# Patient Record
Sex: MALE | Race: WHITE | NOT HISPANIC OR LATINO | Employment: FULL TIME | URBAN - METROPOLITAN AREA
[De-identification: names, ages, dates, MRNs, and addresses within clinical notes are randomized per-mention and may not be internally consistent; named-entity substitution may affect disease eponyms.]

---

## 2017-02-27 ENCOUNTER — APPOINTMENT (EMERGENCY)
Dept: RADIOLOGY | Facility: HOSPITAL | Age: 47
End: 2017-02-27
Payer: COMMERCIAL

## 2017-02-27 ENCOUNTER — HOSPITAL ENCOUNTER (EMERGENCY)
Facility: HOSPITAL | Age: 47
Discharge: HOME | End: 2017-02-27
Attending: EMERGENCY MEDICINE | Admitting: EMERGENCY MEDICINE
Payer: COMMERCIAL

## 2017-02-27 VITALS
HEART RATE: 61 BPM | HEIGHT: 69 IN | OXYGEN SATURATION: 96 % | BODY MASS INDEX: 28.88 KG/M2 | WEIGHT: 195 LBS | SYSTOLIC BLOOD PRESSURE: 141 MMHG | TEMPERATURE: 98.6 F | RESPIRATION RATE: 16 BRPM | DIASTOLIC BLOOD PRESSURE: 90 MMHG

## 2017-02-27 DIAGNOSIS — R07.9 CHEST PAIN: Primary | ICD-10-CM

## 2017-02-27 LAB
ALBUMIN SERPL BCP-MCNC: 3.9 G/DL (ref 3.5–5)
ALP SERPL-CCNC: 93 U/L (ref 46–116)
ALT SERPL W P-5'-P-CCNC: 48 U/L (ref 12–78)
ANION GAP SERPL CALCULATED.3IONS-SCNC: 11 MMOL/L (ref 4–13)
AST SERPL W P-5'-P-CCNC: 24 U/L (ref 5–45)
BASOPHILS # BLD AUTO: 0.1 THOUSANDS/ΜL (ref 0–0.1)
BASOPHILS NFR BLD AUTO: 2 % (ref 0–1)
BILIRUB SERPL-MCNC: 0.6 MG/DL (ref 0.2–1)
BUN SERPL-MCNC: 17 MG/DL (ref 5–25)
CALCIUM SERPL-MCNC: 8.6 MG/DL (ref 8.3–10.1)
CHLORIDE SERPL-SCNC: 106 MMOL/L (ref 100–108)
CO2 SERPL-SCNC: 25 MMOL/L (ref 21–32)
CREAT SERPL-MCNC: 1.1 MG/DL (ref 0.6–1.3)
DEPRECATED D DIMER PPP: 280 NG/ML (FEU) (ref 190–520)
EOSINOPHIL # BLD AUTO: 0.4 THOUSAND/ΜL (ref 0–0.61)
EOSINOPHIL NFR BLD AUTO: 4 % (ref 0–6)
ERYTHROCYTE [DISTWIDTH] IN BLOOD BY AUTOMATED COUNT: 12.8 % (ref 11.6–15.1)
GFR SERPL CREATININE-BSD FRML MDRD: >60 ML/MIN/1.73SQ M
GLUCOSE SERPL-MCNC: 94 MG/DL (ref 65–140)
HCT VFR BLD AUTO: 45.9 % (ref 42–52)
HGB BLD-MCNC: 15.6 G/DL (ref 14–18)
LIPASE SERPL-CCNC: 139 U/L (ref 73–393)
LYMPHOCYTES # BLD AUTO: 2.8 THOUSANDS/ΜL (ref 0.6–4.47)
LYMPHOCYTES NFR BLD AUTO: 31 % (ref 14–44)
MCH RBC QN AUTO: 31.5 PG (ref 27–31)
MCHC RBC AUTO-ENTMCNC: 34 G/DL (ref 31.4–37.4)
MCV RBC AUTO: 93 FL (ref 82–98)
MONOCYTES # BLD AUTO: 0.6 THOUSAND/ΜL (ref 0.17–1.22)
MONOCYTES NFR BLD AUTO: 7 % (ref 4–12)
NEUTROPHILS # BLD AUTO: 5.2 THOUSANDS/ΜL (ref 1.85–7.62)
NEUTS SEG NFR BLD AUTO: 57 % (ref 43–75)
NRBC BLD AUTO-RTO: 0 /100 WBCS
PLATELET # BLD AUTO: 188 THOUSANDS/UL (ref 130–400)
PMV BLD AUTO: 8.6 FL (ref 8.9–12.7)
POTASSIUM SERPL-SCNC: 3.8 MMOL/L (ref 3.5–5.3)
PROT SERPL-MCNC: 7.2 G/DL (ref 6.4–8.2)
RBC # BLD AUTO: 4.96 MILLION/UL (ref 4.7–6.1)
SODIUM SERPL-SCNC: 142 MMOL/L (ref 136–145)
TROPONIN I SERPL-MCNC: <0.02 NG/ML
WBC # BLD AUTO: 9.2 THOUSAND/UL (ref 4.8–10.8)

## 2017-02-27 PROCEDURE — 36415 COLL VENOUS BLD VENIPUNCTURE: CPT | Performed by: EMERGENCY MEDICINE

## 2017-02-27 PROCEDURE — 71010 HB CHEST X-RAY 1 VIEW FRONTAL (PORTABLE): CPT

## 2017-02-27 PROCEDURE — 83690 ASSAY OF LIPASE: CPT | Performed by: EMERGENCY MEDICINE

## 2017-02-27 PROCEDURE — 80053 COMPREHEN METABOLIC PANEL: CPT | Performed by: EMERGENCY MEDICINE

## 2017-02-27 PROCEDURE — 99285 EMERGENCY DEPT VISIT HI MDM: CPT

## 2017-02-27 PROCEDURE — 85025 COMPLETE CBC W/AUTO DIFF WBC: CPT | Performed by: EMERGENCY MEDICINE

## 2017-02-27 PROCEDURE — 93005 ELECTROCARDIOGRAM TRACING: CPT | Performed by: EMERGENCY MEDICINE

## 2017-02-27 PROCEDURE — 84484 ASSAY OF TROPONIN QUANT: CPT | Performed by: EMERGENCY MEDICINE

## 2017-02-27 PROCEDURE — 85379 FIBRIN DEGRADATION QUANT: CPT | Performed by: EMERGENCY MEDICINE

## 2017-02-27 RX ORDER — OMEPRAZOLE 20 MG/1
40 CAPSULE, DELAYED RELEASE ORAL DAILY
Qty: 60 CAPSULE | Refills: 0 | Status: SHIPPED | OUTPATIENT
Start: 2017-02-27 | End: 2018-02-08

## 2017-02-27 RX ORDER — OMEPRAZOLE 20 MG/1
20 CAPSULE, DELAYED RELEASE ORAL EVERY MORNING
COMMUNITY
End: 2018-02-12 | Stop reason: ALTCHOICE

## 2017-02-27 RX ORDER — ASPIRIN 325 MG
325 TABLET, DELAYED RELEASE (ENTERIC COATED) ORAL ONCE
Status: DISCONTINUED | OUTPATIENT
Start: 2017-02-27 | End: 2017-02-27 | Stop reason: HOSPADM

## 2017-02-27 RX ORDER — METOPROLOL TARTRATE 50 MG/1
50 TABLET, FILM COATED ORAL EVERY MORNING
COMMUNITY
End: 2019-11-22 | Stop reason: SDUPTHER

## 2017-02-27 RX ORDER — VALSARTAN 80 MG/1
80 TABLET ORAL EVERY MORNING
COMMUNITY
End: 2019-11-22 | Stop reason: SDUPTHER

## 2017-02-27 RX ORDER — NITROGLYCERIN 0.4 MG/1
0.4 TABLET SUBLINGUAL ONCE
Status: DISCONTINUED | OUTPATIENT
Start: 2017-02-27 | End: 2017-02-27 | Stop reason: HOSPADM

## 2017-03-01 LAB
ATRIAL RATE: 58 BPM
P AXIS: 46 DEGREES
PR INTERVAL: 158 MS
QRS AXIS: -1 DEGREES
QRSD INTERVAL: 108 MS
QT INTERVAL: 426 MS
QTC INTERVAL: 418 MS
T WAVE AXIS: 23 DEGREES
VENTRICULAR RATE: 58 BPM

## 2017-08-02 ENCOUNTER — TRANSCRIBE ORDERS (OUTPATIENT)
Dept: ADMINISTRATIVE | Facility: HOSPITAL | Age: 47
End: 2017-08-02

## 2017-08-02 DIAGNOSIS — R94.5 NONSPECIFIC ABNORMAL RESULTS OF LIVER FUNCTION STUDY: Primary | ICD-10-CM

## 2017-08-02 DIAGNOSIS — R16.1 SPLENOMEGALY: ICD-10-CM

## 2017-08-02 DIAGNOSIS — K75.81 NONALCOHOLIC STEATOHEPATITIS: ICD-10-CM

## 2017-08-15 ENCOUNTER — HOSPITAL ENCOUNTER (OUTPATIENT)
Dept: RADIOLOGY | Facility: HOSPITAL | Age: 47
Discharge: HOME | End: 2017-08-15
Attending: INTERNAL MEDICINE
Payer: COMMERCIAL

## 2017-08-15 DIAGNOSIS — R94.5 NONSPECIFIC ABNORMAL RESULTS OF LIVER FUNCTION STUDY: ICD-10-CM

## 2017-08-15 DIAGNOSIS — R16.1 SPLENOMEGALY: ICD-10-CM

## 2017-08-15 DIAGNOSIS — K75.81 NONALCOHOLIC STEATOHEPATITIS: ICD-10-CM

## 2017-08-15 PROCEDURE — 76700 US EXAM ABDOM COMPLETE: CPT

## 2017-12-26 ENCOUNTER — ALLSCRIPTS OFFICE VISIT (OUTPATIENT)
Dept: OTHER | Facility: OTHER | Age: 47
End: 2017-12-26

## 2017-12-26 DIAGNOSIS — K50.80 CROHN'S DISEASE OF BOTH SMALL AND LARGE INTESTINE WITHOUT COMPLICATION (HCC): ICD-10-CM

## 2017-12-27 NOTE — CONSULTS
Assessment   1  Crohn's disease of both small and large intestine without complication (295 5) (S30 81)   2  GERD without esophagitis (530 81) (K21 9)    Plan   Crohn's disease of both small and large intestine without complication    · Suprep Bowel Prep Kit 17 5-3 13-1 6 GM/180ML Oral Solution; USE AS DIRECTED   Rx By: Chris Patterson; Dispense: 0 Days ; #:1 X 177 ML Bottle (2 Bottles); Refill: 0;For: Crohn's disease of both small and large intestine without complication; LINDA = N; Verified Transmission to [facility]; Last Updated By: [employer]; 12/26/2017 4:34:33 PM   · (1) CALPROTECTIN, FECAL; Status:Active; Requested for:26Dec2017;    Perform:[facility]; Due:26Dec2018; Ordered; For:Crohn's disease of both small and large intestine without complication; Ordered By:Olu Price;   · (1) C-REACTIVE PROTEIN; Status:Active; Requested for:96Ile9913;    Perform:[facility]; Due:97Rjb5752; Ordered; For:Crohn's disease of both small and large intestine without complication; Ordered By:Olu Price;   · (1) SED RATE; Status:Active; Requested for:[id number];    Perform:[facility]; Due:26Dec2018; Ordered; For:Crohn's disease of both small and large intestine without complication; Ordered By:Olu Price;   · * DXA BONE DENSITY SPINE HIP AND PELVIS; Status:Active; Requested for:26Dec2017;       Perform:[facility]; Due:26Dec2018; Last Updated By:Tessa Owusu; 12/26/2017 4:45:31 PM;Ordered; For:Crohn's disease of both small and large intestine without complication; Ordered By:Olu Price;   · COLONOSCOPY (GI, SURG); Status:Active; Requested for:26Dec2017;    Perform:[facility]; Due:26Dec2018; Last Updated By:Tessa Owusu; 12/26/2017 4:43:11 PM;Ordered; For:Crohn's disease of both small and large intestine without complication; Ordered By:Olu Price;  GERD without esophagitis    · EGD; Status:Active;  Requested for:58Pyz7133;    Perform:[facility]; Due:58Wrj6537; Last Updated By:Mario Owusu; 12/26/2017 4:43:11 PM;Ordered; For:GERD without esophagitis; Ordered By:Olu Price;    Discussion/Summary   Discussion Summary:    Agustin 49year-old gentle with longstanding history of Crohn's disease, he has had 2 ileocolonic surgeries many years ago, last surgery was about 6 years ago, in clinical remission on Remicade and history of chronic GERD  Crohn's disease: Predominant the small bowel and large intestine status post resection x2, in clinical remission on Remicade last colonoscopy was about 5 or 6 years ago the patient to get a DEXA scan the patient get vaccinated for influenza and pneumonia will check a sed rate, CRP, fecal calprotectin for baseline inflammatory markers should proceed with colonoscopy to evaluate for any mucosal damage with family history of colon cancer in his father, we will also check for polyps and colon cancer screening home Remicade infusions reports that he is not receiving or taking Benadryl and Tylenol prior to his infusion   Chronic GERD: is on PPI therapy will proceed with an upper endoscopy the same time as colonoscopy to exclude any Srivastava's esophagus and upper GI Crohn's   discussed with him the risks of the procedures including bleeding, surgery, perforation, missed polyp detection rate  Chief Complaint   Chief Complaint Free Text Note Form: Here to establish care for Crohn's disease      History of Present Illness   HPI: Agustin 18-year-old gentle with longstanding history of ileal Crohn's disease for over 20 years, he has had 2 ileal cecectomy is, has been on Remicade for the past 5 years and has been in clinical remission  He has 1 to 2 soft bowel movements daily  He had 1 episode of rectal bleeding about a week ago  Has a history of iritis in the past but nothing for the last 10 years  No significant arthralgias while on Remicade  His appetite is good  He has gained significant weight   He denies any nausea vomiting but has a history of chronic acid reflux has been on PPI therapy for greater than 10 15 years, if he stops PPI therapy for 1 or 2 days he has significant rebound symptoms  Denies any dysphagia  patient denies any abdominal pain  He has a strong family history of inflammatory bowel disease in his paternal uncle and cousins  His father was diagnosed with colon cancer last year the age of 79  Patient's last colonoscopy was 4 or 5 years ago  works for [employer]  Denies any tobacco  He drinks about 4 drinks per week  Patient receives Remicade infusions at home every 8 weeks  Review of Systems   Complete-Male GI Adult: Other Symptoms: The remainder of the ten ROS was negative  Past Medical History   1  History of Crohn's disease (V12 70) (Z87 19)  Active Problems And Past Medical History Reviewed: The active problems and past medical history were reviewed and updated today  Surgical History   1  History of Complete Colonoscopy  Surgical History Reviewed: The surgical history was reviewed and updated today  Family History   Father    1  Family history of colon cancer (V16 0) (Z80 0)  Uncle    2  Family history of Crohn's disease (V18 59) (Z80 76)  Family History Reviewed: The family history was reviewed and updated today  Social History    · No alcohol use   · Occasional alcohol consumption (V49 89) (Z78 9)  Social History Reviewed: The social history was reviewed and updated today  Current Meds    1  Metoprolol Tartrate 50 MG Oral Tablet; Therapy: (Recorded:33Bsc1596) to Recorded   2  Omeprazole 40 MG Oral Capsule Delayed Release; Therapy: (Recorded:25Dbs7039) to Recorded   3  Remicade 100 MG Intravenous Solution Reconstituted; Therapy: (Recorded:02Qgv2194) to Recorded   4  Valsartan 160 MG Oral Tablet; Therapy: (Recorded:76Sem3132) to Recorded  Medication List Reviewed: The medication list was reviewed and updated today        Allergies 1  No Known Drug Allergies    Vitals   Vital Signs    Recorded: 88IXK6289 04:00PM   Temperature 96 5 F   Heart Rate 72   Systolic 407   Diastolic 68   Weight 355 lb    O2 Saturation 98     Physical Exam   Gen: wn/wd, NAD     HEENT: anicteric, MMM, no cervical LAD     CVS: RRR, no m/r/g     CHEST: CTA b/l     ABD: +BS, soft, NT,ND, no hepatosplenomegaly, well-healed midline surgical scar     EXT: no c/c/e     NEURO: aaox3     SKIN: NO rashes             Future Appointments      Date/Time Provider Specialty Site   02/12/2018 08:30 AM BRIANNA Sotelo   Gastroenterology Adult [facility]     Signatures    Electronically signed by : BRIANNA Palomo ; Dec 26 2017  5:55PM EST                       (Author)

## 2018-01-23 VITALS
OXYGEN SATURATION: 98 % | DIASTOLIC BLOOD PRESSURE: 68 MMHG | BODY MASS INDEX: 30.72 KG/M2 | SYSTOLIC BLOOD PRESSURE: 118 MMHG | HEART RATE: 72 BPM | TEMPERATURE: 96.5 F | WEIGHT: 208 LBS

## 2018-01-23 NOTE — CONSULTS
Chief Complaint  Here to establish care for Crohn's disease      History of Present Illness  Pleasant 16-year-old gentle with longstanding history of ileal Crohn's disease for over 20 years, he has had 2 ileal cecectomy is, has been on Remicade for the past 5 years and has been in clinical remission  He has 1 to 2 soft bowel movements daily  He had 1 episode of rectal bleeding about a week ago  Has a history of iritis in the past but nothing for the last 10 years  No significant arthralgias while on Remicade  His appetite is good  He has gained significant weight  He denies any nausea vomiting but has a history of chronic acid reflux has been on PPI therapy for greater than 10 15 years, if he stops PPI therapy for 1 or 2 days he has significant rebound symptoms  Denies any dysphagia  His patient denies any abdominal pain  He has a strong family history of inflammatory bowel disease in his paternal uncle and cousins  His father was diagnosed with colon cancer last year the age of 79  Patient's last colonoscopy was 4 or 5 years ago  He works for [employer]  Denies any tobacco  He drinks about 4 drinks per week  Patient receives Remicade infusions at home every 8 weeks  Review of Systems    Other Symptoms: The remainder of the ten ROS was negative  Past Medical History    · History of Crohn's disease (V12 70) (Z87 19)    The active problems and past medical history were reviewed and updated today  Surgical History    · History of Complete Colonoscopy    The surgical history was reviewed and updated today  Family History    · Family history of colon cancer (V16 0) (Z80 0)    · Family history of Crohn's disease (V18 59) (Z83 79)    The family history was reviewed and updated today  Social History    · No alcohol use   · Occasional alcohol consumption (V49 89) (Z78 9)  The social history was reviewed and updated today  Current Meds   1   Metoprolol Tartrate 50 MG Oral Tablet; Therapy: (Recorded:20Znt0841) to Recorded   2  Omeprazole 40 MG Oral Capsule Delayed Release; Therapy: (Recorded:17Umk2829) to Recorded   3  Remicade 100 MG Intravenous Solution Reconstituted; Therapy: (Recorded:83Fek2915) to Recorded   4  Valsartan 160 MG Oral Tablet; Therapy: (Recorded:66Uiy8923) to Recorded    The medication list was reviewed and updated today  Allergies    1  No Known Drug Allergies    Vitals   Recorded: 50PBH9395 04:00PM   Temperature 96 5 F   Heart Rate 72   Systolic 847   Diastolic 68   Weight 742 lb    O2 Saturation 98     Physical Exam  Gen: wn/wd, NAD  HEENT: anicteric, MMM, no cervical LAD  CVS: RRR, no m/r/g  CHEST: CTA b/l  ABD: +BS, soft, NT,ND, no hepatosplenomegaly, well-healed midline surgical scar  EXT: no c/c/e  NEURO: aaox3  SKIN: NO rashes         Assessment    1  Crohn's disease of both small and large intestine without complication (358 1) (W28 65)   2  GERD without esophagitis (530 81) (K21 9)    Plan  Crohn's disease of both small and large intestine without complication    · Suprep Bowel Prep Kit 17 5-3 13-1 6 GM/180ML Oral Solution; USE AS DIRECTED   Rx By: Dayanara Sheets; Dispense: 0 Days ; #:1 X 177 ML Bottle (2 Bottles); Refill: 0; For: Crohn's disease of both small and large intestine without complication; LINDA = N; Verified Transmission to [facility]; Last Updated By: [employer]; 12/26/2017 4:34:33 PM   · (1) CALPROTECTIN, FECAL; Status:Active; Requested for:58Bcn7959;    Perform:[facility]; Due:32Euz6535; Ordered; For:Crohn's disease of both small and large intestine without complication; Ordered By:Olu Price;   · (1) C-REACTIVE PROTEIN; Status:Active; Requested for:55Nwt1709;    Perform:[facility]; Due:58Gko6645; Ordered; For:Crohn's disease of both small and large intestine without complication; Ordered By:Olu Price;   · (1) SED RATE; Status:Active;  Requested for:87Ymw5346;    Perform:[facility]; ZSN:[id number]; Ordered; For:Crohn's disease of both small and large intestine without complication; Ordered By:Olu Price;   · * DXA BONE DENSITY SPINE HIP AND PELVIS; Status:Active; Requested for:26Dec2017;     Perform:[facility]; Due:26Dec2018; Last Updated By:Rashad Owusu Bound; 12/26/2017 4:45:31 PM;Ordered; For:Crohn's disease of both small and large intestine without complication; Ordered By:Olu Price;   · COLONOSCOPY (GI, SURG); Status:Active; Requested for:26Dec2017;    Perform:[facility]; Due:26Dec2018; Last Updated By:Rashad Owusu Bound; 12/26/2017 4:43:11 PM;Ordered; For:Crohn's disease of both small and large intestine without complication; Ordered By:Olu Price;  GERD without esophagitis    · EGD; Status:Active; Requested for:26Dec2017;    Perform:[facility]; Due:72Ohw1756; Last Updated By:Rashad Owusu Bound; 12/26/2017 4:43:11 PM;Ordered; For:GERD without esophagitis; Ordered By:Olu Price;    Discussion/Summary    Pleasant 49year-old gentle with longstanding history of Crohn's disease, he has had 2 ileocolonic surgeries many years ago, last surgery was about 6 years ago, in clinical remission on Remicade and history of chronic GERD  1  Crohn's disease: Predominant the small bowel and large intestine status post resection x2, in clinical remission on Remicade  -his last colonoscopy was about 5 or 6 years ago  -recommended the patient to get a DEXA scan  -recommend the patient get vaccinated for influenza and pneumonia  -we will check a sed rate, CRP, fecal calprotectin for baseline inflammatory markers  -we should proceed with colonoscopy to evaluate for any mucosal damage  -also with family history of colon cancer in his father, we will also check for polyps and colon cancer screening  -continue home Remicade infusions  -patient reports that he is not receiving or taking Benadryl and Tylenol prior to his infusion    2   Chronic GERD:  -patient is on PPI therapy  -we will proceed with an upper endoscopy the same time as colonoscopy to exclude any Srivastava's esophagus and upper GI Crohn's    I discussed with him the risks of the procedures including bleeding, surgery, perforation, missed polyp detection rate        Future Appointments    Signatures   Electronically signed by : BRIANNA Cordova ; Dec 26 2017  5:55PM EST                       (Author)

## 2018-01-25 LAB — CALPROTECTIN STL-MCNT: 26 UG/G (ref 0–120)

## 2018-01-29 ENCOUNTER — TELEPHONE (OUTPATIENT)
Dept: GASTROENTEROLOGY | Facility: CLINIC | Age: 48
End: 2018-01-29

## 2018-02-01 PROBLEM — K50.819 CROHN'S DISEASE OF SMALL AND LARGE INTESTINES WITH COMPLICATION (HCC): Status: ACTIVE | Noted: 2018-02-01

## 2018-02-01 PROBLEM — K21.9 GASTROESOPHAGEAL REFLUX DISEASE WITHOUT ESOPHAGITIS: Status: ACTIVE | Noted: 2018-02-01

## 2018-02-08 RX ORDER — INFLIXIMAB 100 MG/10ML
INJECTION, POWDER, LYOPHILIZED, FOR SOLUTION INTRAVENOUS
COMMUNITY
End: 2019-02-20 | Stop reason: SDUPTHER

## 2018-02-08 NOTE — PRE-PROCEDURE INSTRUCTIONS
Pre-Surgery Instructions:   Medication Instructions    inFLIXimab (REMICADE) 100 mg Patient was instructed by Physician and understands   metoprolol tartrate (LOPRESSOR) 50 mg tablet Patient was instructed by Physician and understands   omeprazole (PriLOSEC) 20 mg delayed release capsule Patient was instructed by Physician and understands   valsartan (DIOVAN) 80 mg tablet Patient was instructed by Physician and understands  Pt to follow Dr Juliane Umanzor instructions    [employer]

## 2018-02-09 DIAGNOSIS — K50.818 CROHN'S DISEASE OF BOTH SMALL AND LARGE INTESTINE WITH OTHER COMPLICATION (HCC): Primary | ICD-10-CM

## 2018-02-11 ENCOUNTER — ANESTHESIA EVENT (OUTPATIENT)
Dept: GASTROENTEROLOGY | Facility: AMBULARY SURGERY CENTER | Age: 48
End: 2018-02-11
Payer: COMMERCIAL

## 2018-02-12 ENCOUNTER — HOSPITAL ENCOUNTER (OUTPATIENT)
Facility: AMBULARY SURGERY CENTER | Age: 48
Setting detail: OUTPATIENT SURGERY
Discharge: HOME | End: 2018-02-12
Attending: INTERNAL MEDICINE | Admitting: INTERNAL MEDICINE
Payer: COMMERCIAL

## 2018-02-12 ENCOUNTER — ANESTHESIA (OUTPATIENT)
Dept: GASTROENTEROLOGY | Facility: AMBULARY SURGERY CENTER | Age: 48
End: 2018-02-12
Payer: COMMERCIAL

## 2018-02-12 ENCOUNTER — TELEPHONE (OUTPATIENT)
Dept: GASTROENTEROLOGY | Facility: CLINIC | Age: 48
End: 2018-02-12

## 2018-02-12 VITALS
HEART RATE: 66 BPM | OXYGEN SATURATION: 98 % | TEMPERATURE: 96.9 F | SYSTOLIC BLOOD PRESSURE: 122 MMHG | RESPIRATION RATE: 18 BRPM | DIASTOLIC BLOOD PRESSURE: 83 MMHG

## 2018-02-12 DIAGNOSIS — K21.9 GASTROESOPHAGEAL REFLUX DISEASE WITHOUT ESOPHAGITIS: ICD-10-CM

## 2018-02-12 DIAGNOSIS — K50.819 CROHN'S DISEASE OF SMALL AND LARGE INTESTINES WITH COMPLICATION (HCC): ICD-10-CM

## 2018-02-12 PROCEDURE — 88305 TISSUE EXAM BY PATHOLOGIST: CPT | Performed by: INTERNAL MEDICINE

## 2018-02-12 PROCEDURE — 45380 COLONOSCOPY AND BIOPSY: CPT | Performed by: INTERNAL MEDICINE

## 2018-02-12 PROCEDURE — 88305 TISSUE EXAM BY PATHOLOGIST: CPT | Performed by: PATHOLOGY

## 2018-02-12 PROCEDURE — 43239 EGD BIOPSY SINGLE/MULTIPLE: CPT | Performed by: INTERNAL MEDICINE

## 2018-02-12 PROCEDURE — 88342 IMHCHEM/IMCYTCHM 1ST ANTB: CPT | Performed by: PATHOLOGY

## 2018-02-12 PROCEDURE — 88342 IMHCHEM/IMCYTCHM 1ST ANTB: CPT | Performed by: INTERNAL MEDICINE

## 2018-02-12 RX ORDER — PROPOFOL 10 MG/ML
INJECTION, EMULSION INTRAVENOUS AS NEEDED
Status: DISCONTINUED | OUTPATIENT
Start: 2018-02-12 | End: 2018-02-12 | Stop reason: SURG

## 2018-02-12 RX ORDER — OMEPRAZOLE 40 MG/1
40 CAPSULE, DELAYED RELEASE ORAL 2 TIMES DAILY
Qty: 60 CAPSULE | Refills: 6 | Status: SHIPPED | OUTPATIENT
Start: 2018-02-12 | End: 2018-02-12 | Stop reason: ALTCHOICE

## 2018-02-12 RX ORDER — SODIUM CHLORIDE 9 MG/ML
125 INJECTION, SOLUTION INTRAVENOUS CONTINUOUS
Status: DISCONTINUED | OUTPATIENT
Start: 2018-02-12 | End: 2018-02-12 | Stop reason: SDUPTHER

## 2018-02-12 RX ORDER — SODIUM CHLORIDE 9 MG/ML
125 INJECTION, SOLUTION INTRAVENOUS CONTINUOUS
Status: DISCONTINUED | OUTPATIENT
Start: 2018-02-12 | End: 2018-02-12 | Stop reason: HOSPADM

## 2018-02-12 RX ORDER — PROPOFOL 10 MG/ML
INJECTION, EMULSION INTRAVENOUS CONTINUOUS PRN
Status: DISCONTINUED | OUTPATIENT
Start: 2018-02-12 | End: 2018-02-12 | Stop reason: SURG

## 2018-02-12 RX ORDER — ESOMEPRAZOLE MAGNESIUM 40 MG/1
40 CAPSULE, DELAYED RELEASE ORAL
Qty: 60 CAPSULE | Refills: 3 | Status: SHIPPED | OUTPATIENT
Start: 2018-02-12 | End: 2018-08-30 | Stop reason: ALTCHOICE

## 2018-02-12 RX ADMIN — PROPOFOL 160 MCG/KG/MIN: 10 INJECTION, EMULSION INTRAVENOUS at 09:31

## 2018-02-12 RX ADMIN — SODIUM CHLORIDE: 0.9 INJECTION, SOLUTION INTRAVENOUS at 09:29

## 2018-02-12 RX ADMIN — PROPOFOL 100 MG: 10 INJECTION, EMULSION INTRAVENOUS at 09:40

## 2018-02-12 RX ADMIN — SODIUM CHLORIDE 125 ML/HR: 0.9 INJECTION, SOLUTION INTRAVENOUS at 09:09

## 2018-02-12 RX ADMIN — PROPOFOL 100 MG: 10 INJECTION, EMULSION INTRAVENOUS at 09:31

## 2018-02-12 NOTE — H&P
History and Physical -  [facility]  Jillian Hamilton 49 y o  male MRN: [id number]    HPI: Jillian Hamilton is a 49y o  year old male who presents with hx of chronic GERD and hx of crohns, on remicade  Review of Systems    Historical Information   Past Medical History:   Diagnosis Date    Crohn's disease ([facility] [address] )     GERD (gastroesophageal reflux disease)     Hypertension      Past Surgical History:   Procedure Laterality Date    COLON SURGERY      x 2 bowel resections    COLONOSCOPY       Social History   History   Alcohol Use    Yes     Comment: occas     History   Drug Use No     History   Smoking Status    Former Smoker   Smokeless Tobacco    Never Used     Family History   Problem Relation Age of Onset    Diabetes Mother     Cancer Father      colon    Hypertension Father     Crohn's disease Paternal Uncle        Meds/Allergies     Prescriptions Prior to Admission   Medication    inFLIXimab (REMICADE) 100 mg    metoprolol tartrate (LOPRESSOR) 50 mg tablet    Na Sulfate-K Sulfate-Mg Sulf (SUPREP BOWEL PREP KIT) 17 5-3 13-1 6 GM/180ML SOLN    omeprazole (PriLOSEC) 20 mg delayed release capsule    valsartan (DIOVAN) 80 mg tablet       No Known Allergies    Objective     Blood pressure 123/80, pulse 67, temperature (!) 96 9 °F (36 1 °C), temperature source Tympanic, resp  rate 18, SpO2 96 %  PHYSICAL EXAM    Gen: NAD  CV: RRR  CHEST: Clear  ABD: soft, NT/ND  EXT: no edema  Neuro: AAO      ASSESSMENT/PLAN:  This is a 49y o  year old male here for  hx of chronic GERD and hx of crohns, on remicade         PLAN:   Procedure: egd/colonoscopy

## 2018-02-12 NOTE — ANESTHESIA PREPROCEDURE EVALUATION
Review of Systems/Medical History  Patient summary reviewed  Chart reviewed      Cardiovascular  Hypertension ,    Pulmonary  Smoker ex-smoker  ,        GI/Hepatic    GERD , Bowel prep  Comment: Gastroesophageal reflux disease without esophagitis  Crohn's disease of small and large intestines with complication ([facility] [address] )            Endo/Other     GYN       Hematology   Musculoskeletal       Neurology   Psychology           Physical Exam    Airway    Mallampati score: II  TM Distance: >3 FB  Neck ROM: full     Dental       Cardiovascular  Rhythm: regular, Rate: normal,     Pulmonary  Breath sounds clear to auscultation,     Other Findings        Anesthesia Plan  ASA Score- 2     Anesthesia Type- general and IV sedation with anesthesia with ASA Monitors  Additional Monitors:   Airway Plan:         Plan Factors-    Induction- intravenous  Postoperative Plan-     Informed Consent- Anesthetic plan and risks discussed with patient

## 2018-02-12 NOTE — OP NOTE
ESOPHAGOGASTRODUODENOSCOPY    PROCEDURE: EGD/ Biopsy    INDICATIONS: GERD    POST-OP DIAGNOSIS: See the impression below    SEDATION: Monitored anesthesia care, check anesthesia records    PHYSICAL EXAM:    Vitals:    02/12/18 0900   BP: 123/80   Pulse: 67   Resp: 18   Temp: (!) 96 9 °F (36 1 °C)   SpO2: 96%    There is no height or weight on file to calculate BMI  General: NAD  Heart: S1 & S2 normal, RRR  Lungs: CTA, No rales or rhonchi  Abdomen: Soft, nontender, nondistended, good bowel sounds    CONSENT:  Informed consent was obtained for the procedure, including sedation after explaining the risks and benefits of the procedure  Risks including but not limited to bleeding, perforation, infection, aspiration were discussed in detail  Also explained about less than 100% sensitivity with the exam and other alternatives  PREPARATION:   EKG tracing, pulse oximetry, blood pressure were monitored throughout the procedure  Patient was identified by myself both verbally and by visual inspection of ID band  DESCRIPTION:   Patient was placed in the left lateral decubitus position and was sedated with the above medication  The gastroscope was introduced in to the oropharynx and the esophagus was intubated under direct visualization  Scope was passed down the esophagus up to 2nd part of the duodenum  A careful inspection was made as the gastroscope was withdrawn, including a retroflexed view of the stomach; findings and interventions are described below  FINDINGS:    #1  Esophagus and GEJ- grade B erosive esophagitis    #2  Stomach- mild gastritis, biopsies taken for H pylori  Small hiatal hernia on retroflexion    #3   Duodenum- normal appearing duodenum         IMPRESSIONS:      Grade B reflux esophagitis  Mild gastritis biopsies taken    RECOMMENDATIONS:     Discharge home  Resume regular diet  Pantoprazole twice daily  Trial of Carafate for 1 to 2 weeks  Follow up biopsy results, call the office in 3 weeks  Repeat upper endoscopy in 3 months  Call with any abdominal pain, bleeding, fevers    COMPLICATIONS:  None; patient tolerated the procedure well            DISPOSITION: PACU           CONDITION: Stable

## 2018-02-12 NOTE — TELEPHONE ENCOUNTER
[facility] called requesting the generic form of omeprazole because insurance will not cover

## 2018-02-12 NOTE — OP NOTE
COLONOSCOPY    PROCEDURE: Colonoscopy/ Biopsy    INDICATIONS: Crohn's Disease    POST-OP DIAGNOSIS: See the impression below    SEDATION: Monitored anesthesia care, check anesthesia records    PHYSICAL EXAM:    /80   Pulse 67   Temp (!) 96 9 °F (36 1 °C) (Tympanic)   Resp 18   SpO2 96%   There is no height or weight on file to calculate BMI  General: NAD  Heart: S1 & S2 normal, RRR  Lungs: CTA, No rales or rhonchi  Abdomen: Soft, nontender, nondistended, good bowel sounds    CONSENT:  Informed consent was obtained for the procedure, including sedation after explaining the risks and benefits of the procedure  Risks including but not limited to bleeding, perforation, infection, aspiration were discussed in detail  Also explained about less than 100%$ sensitivity with the exam and other alternatives  PREPARATION:   EKG tracing, pulse oximetry, blood pressure were monitored throughout the procedure  Patient was identified by myself both verbally and by visual inspection of ID band  DESCRIPTION:   Patient was placed in the left lateral decubitus position and was sedated with the above medication  Digital rectal examination was performed  The colonoscope was introduced in to the anal canal and advanced up to anastomosis  A careful inspection was made as the colonoscope was withdrawn, including a retroflexed view of the rectum; findings and interventions are described below  Appropriate photodocumentation was obtained  The quality of the colonic preparation was good      FINDINGS:    Ileocolonic anastomosis with some circumferential edematous changes and mild ulceration, biopsies taken  The ileum was intubated and was normal appearance for approximately 15 cm  Mild sigmoid erythematous changes, more likely due to prep effect or diverticular disease, biopsies were taken  Sigmoid diverticulosis  Mild internal hemorrhoids on retroflexion  Otherwise a normal appearing colonoscopy with no significant IMPRESSIONS:      Findings of active inflammatory bowel disease ileocolonic anastomosis with edema and ulceration, biopsies taken could be due to local ischemia from anastomosis versus inflammatory bowel disease, overall mild appearance  Normal terminal ileum  Mild sigmoid erythematous changes, biopsies taken  Sigmoid diverticulosis    RECOMMENDATIONS:    Discharge home  Resume regular diet  Resume home medications  Follow up biopsy results, call the office in 3 weeks  Repeat colonoscopy in 5 years  Call with any abdominal pain, bleeding, fevers    COMPLICATIONS:  None; patient tolerated the procedure well      DISPOSITION: PACU           CONDITION: Stable

## 2018-02-12 NOTE — DISCHARGE INSTRUCTIONS
Discharge home  Resume regular diet  Take omeprazole 40 mg twice daily, new prescription has been sent  Follow up biopsy results  Recommend repeat upper endoscopy in 3 months  Repeat colonoscopy as clinically indicated, based on pathology or 5 years  Continue Remicade  Call with any abdominal pain, bleeding, fevers

## 2018-02-13 ENCOUNTER — TELEPHONE (OUTPATIENT)
Dept: GASTROENTEROLOGY | Facility: MEDICAL CENTER | Age: 48
End: 2018-02-13

## 2018-02-13 NOTE — TELEPHONE ENCOUNTER
Layne Taylor,  Respectfully request if you could resolve this issue  This is a Dr Grace Booker Pt  Thank you in advance    RDB

## 2018-02-13 NOTE — TELEPHONE ENCOUNTER
Dr Noel Barton pt    Saray from [address] called stating the Esomeprazole, Omeprazole, Pantoprazole and Lantoprazole are not covered by insurance due to them being able to get over the counter  Ramesh Puga would like to know what do you want to do?  Ramesh Puga can be reached at [phone]

## 2018-02-15 NOTE — TELEPHONE ENCOUNTER
Please find out what PPI may be covered under patient's insurance so I can send it  There are no over the counters for lansoprazole and pantoprazole so I don't know what that was supposed to mean    Thank you

## 2018-02-15 NOTE — TELEPHONE ENCOUNTER
[facility] called again wanting to know the status of medications not covered  Pharmacist stated insurance phone # is [phone] to see what is covered   Pt's ID is [id number]

## 2018-08-14 ENCOUNTER — TELEPHONE (OUTPATIENT)
Dept: GASTROENTEROLOGY | Facility: CLINIC | Age: 48
End: 2018-08-14

## 2018-08-15 ENCOUNTER — TELEPHONE (OUTPATIENT)
Dept: GASTROENTEROLOGY | Facility: CLINIC | Age: 48
End: 2018-08-15

## 2018-08-15 NOTE — TELEPHONE ENCOUNTER
Spoke to patient and he stated that someone called him already and he has an appt on the 30th of august

## 2018-08-15 NOTE — TELEPHONE ENCOUNTER
----- Message from Leslye Mercado MD sent at 8/14/2018  8:25 AM EDT -----  Please inform patient that laboratory studies are stable  Make sure that he follows up in the office with me in the next 2 months      Please have the patient call with any questions or concerns

## 2018-08-30 ENCOUNTER — OFFICE VISIT (OUTPATIENT)
Dept: GASTROENTEROLOGY | Facility: CLINIC | Age: 48
End: 2018-08-30
Payer: COMMERCIAL

## 2018-08-30 VITALS
SYSTOLIC BLOOD PRESSURE: 122 MMHG | HEART RATE: 63 BPM | DIASTOLIC BLOOD PRESSURE: 78 MMHG | TEMPERATURE: 98.1 F | HEIGHT: 69 IN | BODY MASS INDEX: 31.01 KG/M2 | WEIGHT: 209.4 LBS

## 2018-08-30 DIAGNOSIS — K50.819 CROHN'S DISEASE OF SMALL AND LARGE INTESTINES WITH COMPLICATION (HCC): ICD-10-CM

## 2018-08-30 DIAGNOSIS — K21.9 GASTROESOPHAGEAL REFLUX DISEASE WITHOUT ESOPHAGITIS: Primary | ICD-10-CM

## 2018-08-30 PROCEDURE — 99213 OFFICE O/P EST LOW 20 MIN: CPT | Performed by: INTERNAL MEDICINE

## 2018-08-30 RX ORDER — LOSARTAN POTASSIUM 50 MG/1
50 TABLET ORAL DAILY
Refills: 1 | COMMUNITY
Start: 2018-08-03 | End: 2019-11-22 | Stop reason: SDUPTHER

## 2018-08-30 RX ORDER — METOPROLOL SUCCINATE 50 MG/1
50 TABLET, EXTENDED RELEASE ORAL DAILY
Refills: 0 | COMMUNITY
Start: 2018-07-28 | End: 2019-11-22 | Stop reason: SDUPTHER

## 2018-08-30 RX ORDER — OMEPRAZOLE 40 MG/1
CAPSULE, DELAYED RELEASE ORAL DAILY PRN
COMMUNITY
End: 2022-03-23

## 2018-08-30 RX ORDER — PANTOPRAZOLE SODIUM 40 MG/1
40 TABLET, DELAYED RELEASE ORAL DAILY
Qty: 30 TABLET | Refills: 6 | Status: SHIPPED | OUTPATIENT
Start: 2018-08-30 | End: 2019-11-22 | Stop reason: SDUPTHER

## 2018-08-30 RX ORDER — RANITIDINE 150 MG/1
150 CAPSULE ORAL EVERY EVENING
Qty: 30 CAPSULE | Refills: 6 | Status: SHIPPED | OUTPATIENT
Start: 2018-08-30 | End: 2019-02-07 | Stop reason: SDUPTHER

## 2018-08-30 RX ORDER — INFLIXIMAB 100 MG/10ML
INJECTION, POWDER, LYOPHILIZED, FOR SOLUTION INTRAVENOUS
COMMUNITY
End: 2019-02-20 | Stop reason: SDUPTHER

## 2018-08-30 NOTE — PATIENT INSTRUCTIONS
Take pantoprazole or omeprazole in am, take ranitidine in the pm  If doing well, take ranitidine twice daily and stop omeprazole  Talk to PCP for pneumonia and flu vaccine  See dermatoligist  Bone scan

## 2018-08-31 NOTE — PROGRESS NOTES
[facility]  Adolph Dawn 49 y o  male MRN: [id number]            Assessment & Plan:    Pleasant 50-year-old gentleman with a history of ileal Crohn's disease status post ileocolonic resection x2, clinically maintained in remission on Remicade  1   Crohn's disease of his small intestine:  Status post surgical resection  -continue Remicade, patient is clinically doing very well  -we will see the patient back in 6 months time  -ordered a DEXA scan for the patient, we had ordered this on his previous visit which he did not complete  -recommend the patient follow up with PCP for pneumonia and influenza vaccination  -recommend annual dermatology visit for skin cancer screening    2  GERD:  Much improved with 40 mg of PPI therapy  -we will transition him to pantoprazole, also ranitidine at bedtime  -patient was instructed to slowly try to wean off of the a m  dose of PPI therapy    Will see the patient back in 6 months time      _____________________________________________________________        CC: Follow-up of Crohn's disease    HPI:  Adolph Dawn is a 49 y o male who is here for follow-up of Crohn's disease  As you know this is a pleasant 50-year-old gentle with a history of ileal Crohn's disease status post resection x2, currently in clinical remission on Remicade  He his last colonoscopy sore some inflammatory changes around the anastomosis which is most likely postsurgical   Patient reports having regular formed bowel movements no significant abdominal pain  He also had significant esophagitis on his last endoscopy but since starting omeprazole 40 mg he reports that his reflux has significantly improved  Denies any nausea, vomiting, dysphagia  Her weight has been stable  ROS:  The remainder of the ROS was negative except for the pertinent positives mentioned in HPI  Allergies: Patient has no known allergies      Medications:   Current Outpatient Prescriptions:     inFLIXimab (REMICADE) 100 mg, Infuse into a venous catheter, Disp: , Rfl:     losartan (COZAAR) 50 mg tablet, Take 50 mg by mouth daily, Disp: , Rfl: 1    metoprolol tartrate (LOPRESSOR) 50 mg tablet, Take 50 mg by mouth every morning  , Disp: , Rfl:     omeprazole (PriLOSEC) 40 MG capsule, Take by mouth, Disp: , Rfl:     inFLIXimab (REMICADE) 100 mg, Infuse into a venous catheter Every 2 months, Disp: , Rfl:     metoprolol succinate (TOPROL-XL) 50 mg 24 hr tablet, Take 50 mg by mouth daily, Disp: , Rfl: 0    Na Sulfate-K Sulfate-Mg Sulf (SUPREP BOWEL PREP KIT) 17 5-3 13-1 6 GM/180ML SOLN, Take 1 kit by mouth once for 1 dose, Disp: 2 Bottle, Rfl: 0    pantoprazole (PROTONIX) 40 mg tablet, Take 1 tablet (40 mg total) by mouth daily, Disp: 30 tablet, Rfl: 6    ranitidine (ZANTAC) 150 MG capsule, Take 1 capsule (150 mg total) by mouth every evening, Disp: 30 capsule, Rfl: 6    valsartan (DIOVAN) 80 mg tablet, Take 80 mg by mouth every morning  , Disp: , Rfl:     Past Medical History:   Diagnosis Date    [facility] ([facility] [address] )     GERD (gastroesophageal reflux disease)     Hypertension        Past Surgical History:   Procedure Laterality Date    COLON SURGERY      x 2 bowel resections    COLONOSCOPY      MS COLONOSCOPY FLX DX W/COLLJ SPEC WHEN PFRMD N/A 2/12/2018    Procedure: EGD AND COLONOSCOPY;  Surgeon: Rashad Torrez MD;  Location: [facility]; Service: Gastroenterology       Family History   Problem Relation Age of Onset    Diabetes Mother     Cancer Father         colon    Hypertension Father     Crohn's disease Paternal Uncle         reports that he has quit smoking  He has never used smokeless tobacco  He reports that he drinks alcohol  He reports that he does not use drugs        Physical Exam:    /78 (BP Location: Right arm, Patient Position: Sitting, Cuff Size: Standard)   Pulse 63   Temp 98 1 °F (36 7 °C) (Tympanic)   Ht 5' 9" (1 753 m)   Wt 95 kg (209 lb 6 4 oz)   BMI 30 92 kg/m²     Gen: wn/wd, NAD  HEENT: anicteric, MMM, no cervical LAD  CVS: RRR, no m/r/g  CHEST: CTA b/l  ABD: +BS, soft, NT,ND, no hepatosplenomegaly  EXT: no c/c/e  NEURO: aaox3  SKIN: NO rashes

## 2018-09-18 ENCOUNTER — TELEPHONE (OUTPATIENT)
Dept: GASTROENTEROLOGY | Facility: CLINIC | Age: 48
End: 2018-09-18

## 2018-09-18 NOTE — TELEPHONE ENCOUNTER
At recent office visit, it was recommended the patient wean down to once-daily pantoprazole, from twice daily, and continue ranitidine  Can you please find if (a) any other PPI is covered, and (b) if once-daily pantoprazole could be covered? If once-daily pantoprazole 40 mg can be covered, we can try this and continue the ranitidine    Thank you

## 2018-09-18 NOTE — TELEPHONE ENCOUNTER
Dr Edgar Fragoso pt    [facility] called in to advise pantoprazole is not covered by the pts ins  Please advise   [phone]

## 2019-01-25 ENCOUNTER — TELEPHONE (OUTPATIENT)
Dept: GASTROENTEROLOGY | Facility: CLINIC | Age: 49
End: 2019-01-25

## 2019-01-25 NOTE — TELEPHONE ENCOUNTER
----- Message from Gill Krause MD sent at 1/24/2019  1:07 PM EST -----  Please inform the patient that recent laboratory markers including inflammatory blood tests are normal which is good news  Please make sure that he has an office follow-up in the next 2 to 3 months with myself or PA if doing well      Please have him call with any questions or concerns

## 2019-02-07 DIAGNOSIS — K21.9 GASTROESOPHAGEAL REFLUX DISEASE WITHOUT ESOPHAGITIS: ICD-10-CM

## 2019-02-07 RX ORDER — RANITIDINE 150 MG/1
150 CAPSULE ORAL EVERY EVENING
Qty: 30 CAPSULE | Refills: 5 | Status: SHIPPED | OUTPATIENT
Start: 2019-02-07 | End: 2019-05-01 | Stop reason: SDUPTHER

## 2019-02-14 ENCOUNTER — TELEPHONE (OUTPATIENT)
Dept: GASTROENTEROLOGY | Facility: CLINIC | Age: 49
End: 2019-02-14

## 2019-02-14 NOTE — TELEPHONE ENCOUNTER
Called patient to inform him that his dexa scan does not need an authorization through [facility]   The order expires on 02/26/2019

## 2019-02-19 ENCOUNTER — TELEPHONE (OUTPATIENT)
Dept: GASTROENTEROLOGY | Facility: AMBULARY SURGERY CENTER | Age: 49
End: 2019-02-19

## 2019-02-19 NOTE — TELEPHONE ENCOUNTER
DR Katiana Mckenzie PT    [facility] [phone] called requesting pt most resent office notes

## 2019-02-20 DIAGNOSIS — K50.919 CROHN'S DISEASE WITH COMPLICATION, UNSPECIFIED GASTROINTESTINAL TRACT LOCATION (HCC): Primary | ICD-10-CM

## 2019-02-20 RX ORDER — INFLIXIMAB 100 MG/10ML
INJECTION, POWDER, LYOPHILIZED, FOR SOLUTION INTRAVENOUS
Qty: 4.4 EACH | Refills: 3 | Status: SHIPPED | OUTPATIENT
Start: 2019-02-20 | End: 2020-11-09 | Stop reason: SDUPTHER

## 2019-02-20 NOTE — TELEPHONE ENCOUNTER
I've ordered the script as ordered; remicade 440 mg IV q 8 weeks, and set to print    Please fax and let us know if anything else is needed, thank you

## 2019-02-20 NOTE — TELEPHONE ENCOUNTER
Malu herrera print script to reflect directions  Thanks   This needs to be sent to [facility]

## 2019-03-11 ENCOUNTER — HOSPITAL ENCOUNTER (OUTPATIENT)
Dept: RADIOLOGY | Facility: HOSPITAL | Age: 49
Discharge: HOME | End: 2019-03-11
Attending: INTERNAL MEDICINE
Payer: COMMERCIAL

## 2019-03-11 ENCOUNTER — TRANSCRIBE ORDERS (OUTPATIENT)
Dept: ADMINISTRATIVE | Facility: HOSPITAL | Age: 49
End: 2019-03-11

## 2019-03-11 DIAGNOSIS — K21.9 GASTROESOPHAGEAL REFLUX DISEASE WITHOUT ESOPHAGITIS: ICD-10-CM

## 2019-03-11 PROCEDURE — 77080 DXA BONE DENSITY AXIAL: CPT

## 2019-03-12 ENCOUNTER — TELEPHONE (OUTPATIENT)
Dept: GASTROENTEROLOGY | Facility: CLINIC | Age: 49
End: 2019-03-12

## 2019-03-12 NOTE — TELEPHONE ENCOUNTER
----- Message from Haim Villa MD sent at 3/11/2019  9:17 PM EDT -----  Please inform patient that DEXA scan is normal, we will see him at upcoming follow-up visit  Please have the patient call with any questions, concerns, change in symptoms

## 2019-05-01 DIAGNOSIS — K21.9 GASTROESOPHAGEAL REFLUX DISEASE WITHOUT ESOPHAGITIS: ICD-10-CM

## 2019-05-01 RX ORDER — RANITIDINE 150 MG/1
150 CAPSULE ORAL EVERY EVENING
Qty: 30 CAPSULE | Refills: 0 | Status: SHIPPED | OUTPATIENT
Start: 2019-05-01 | End: 2019-06-12 | Stop reason: SDUPTHER

## 2019-06-12 DIAGNOSIS — K21.9 GASTROESOPHAGEAL REFLUX DISEASE WITHOUT ESOPHAGITIS: ICD-10-CM

## 2019-06-12 RX ORDER — RANITIDINE 150 MG/1
150 CAPSULE ORAL EVERY EVENING
Qty: 30 CAPSULE | Refills: 0 | Status: SHIPPED | OUTPATIENT
Start: 2019-06-12 | End: 2019-11-22 | Stop reason: SDUPTHER

## 2019-07-09 ENCOUNTER — TELEPHONE (OUTPATIENT)
Dept: GASTROENTEROLOGY | Facility: AMBULARY SURGERY CENTER | Age: 49
End: 2019-07-09

## 2019-07-09 NOTE — TELEPHONE ENCOUNTER
----- Message from Nhan Shelley MD sent at 7/8/2019 11:54 AM EDT -----  Please inform patient that recent laboratory studies were normal   He should have an office visit given that it has been about a year since his last visit  Please have him call with any questions or concerns

## 2019-09-26 PROBLEM — R79.89 ABNORMAL LFTS: Status: ACTIVE | Noted: 2019-09-26

## 2019-09-26 PROBLEM — R16.1 SPLENOMEGALY: Status: ACTIVE | Noted: 2019-09-26

## 2019-09-26 PROBLEM — E78.00 HYPERCHOLESTEREMIA: Status: ACTIVE | Noted: 2019-09-26

## 2019-09-26 PROBLEM — I10 HYPERTENSION: Status: ACTIVE | Noted: 2019-09-26

## 2019-09-26 PROBLEM — K50.90 CROHN'S DISEASE (HCC): Status: ACTIVE | Noted: 2019-09-26

## 2019-09-26 PROBLEM — K50.80 CROHN'S DISEASE OF BOTH SMALL AND LARGE INTESTINE WITHOUT COMPLICATION (HCC): Status: ACTIVE | Noted: 2017-12-26

## 2019-09-26 PROBLEM — K21.9 GERD WITHOUT ESOPHAGITIS: Status: ACTIVE | Noted: 2017-12-26

## 2019-09-26 PROBLEM — K80.20 CHOLELITHIASIS: Status: ACTIVE | Noted: 2019-09-26

## 2019-09-26 PROBLEM — E55.9 VITAMIN D DEFICIENCY: Status: ACTIVE | Noted: 2019-09-26

## 2019-10-08 ENCOUNTER — TELEPHONE (OUTPATIENT)
Dept: GASTROENTEROLOGY | Facility: CLINIC | Age: 49
End: 2019-10-08

## 2019-10-08 DIAGNOSIS — K50.919 CROHN'S DISEASE WITH COMPLICATION, UNSPECIFIED GASTROINTESTINAL TRACT LOCATION (HCC): ICD-10-CM

## 2019-10-08 NOTE — TELEPHONE ENCOUNTER
I spoke with her, 1 year supply will be ordered   Patient also needs a follow in the next few months, he hasn't been seen in over a year

## 2019-10-08 NOTE — TELEPHONE ENCOUNTER
Yes he is still to get infusions and yes same dose  Do I need to send it/where do it send it or were they just clarifying?

## 2019-10-08 NOTE — TELEPHONE ENCOUNTER
They were just asking for a verbal verification  If you can, the number is [phone], pharmacist name is stuart

## 2019-10-08 NOTE — TELEPHONE ENCOUNTER
[facility] calling for refill of remicade  They just want to verify pt is still to get infusions and dosing is still the same

## 2019-11-05 ENCOUNTER — TELEPHONE (OUTPATIENT)
Dept: GASTROENTEROLOGY | Facility: CLINIC | Age: 49
End: 2019-11-05

## 2019-11-05 NOTE — TELEPHONE ENCOUNTER
----- Message from Michel Lee MD sent at 11/5/2019  8:05 AM EST -----  Patient needs an ov   We cannot refill remicade beyond 3 more months if he does not come in, it has been over 1 year

## 2019-11-22 PROBLEM — R53.83 FATIGUE: Status: ACTIVE | Noted: 2019-11-22

## 2019-11-22 PROBLEM — A69.20 LYME DISEASE: Status: ACTIVE | Noted: 2019-11-22

## 2020-01-02 PROBLEM — E78.1 HYPERTRIGLYCERIDEMIA: Status: ACTIVE | Noted: 2020-01-02

## 2020-01-22 ENCOUNTER — TELEPHONE (OUTPATIENT)
Dept: GASTROENTEROLOGY | Facility: CLINIC | Age: 50
End: 2020-01-22

## 2020-01-22 NOTE — TELEPHONE ENCOUNTER
Patients GI provider:  Dr Roberta Kennedy     Number to return call: [phone] fax#[phone]    Reason for call: Silvina Johnson from [facility]  called stating she needs pt's last office visit note to be faxed to her so she can get pt's home infusion authorized       Scheduled procedure/appointment date if applicable: NA

## 2020-01-28 ENCOUNTER — TELEPHONE (OUTPATIENT)
Dept: OTHER | Facility: OTHER | Age: 50
End: 2020-01-28

## 2020-01-29 ENCOUNTER — TELEPHONE (OUTPATIENT)
Dept: GASTROENTEROLOGY | Facility: CLINIC | Age: 50
End: 2020-01-29

## 2020-01-29 NOTE — TELEPHONE ENCOUNTER
Patient called, needs Remicade insurance will not authorize until patient is seen in office  He is currently in [place]   has not been seen in office since 8/2018  Patient asking for a phone call to advise   Thank you

## 2020-01-29 NOTE — TELEPHONE ENCOUNTER
Reviewed patient's chart  Last office vist 8/18  He had an appt in 8/19 and 9/19 that was cancelled  Called and left message in October 2019 to schedule appt and nothing was scheduled until today  Appt today he also cancelled  If he has moved to [facility] or is staying there long term, he will need to see a GI there to get insurance approval  Polly Mina need to be seen in office when he returns  There is nothing we can do if insurance is requiring office visit

## 2020-02-02 ENCOUNTER — TELEPHONE (OUTPATIENT)
Dept: OTHER | Facility: OTHER | Age: 50
End: 2020-02-02

## 2020-02-02 NOTE — TELEPHONE ENCOUNTER
Patient calling to leave message for office  Patient says infusion center is telling him that Dr Roberta Kennedy did not put any notes for them to come give patient infusion this week  Patient is due for his next infusion on Friday 02/07  Dr Roberta Kennedy needs to put his notes in for the infusion center to send someone out to patient home for infusion  Please call patient back asap

## 2020-02-03 ENCOUNTER — TELEPHONE (OUTPATIENT)
Dept: GASTROENTEROLOGY | Facility: AMBULARY SURGERY CENTER | Age: 50
End: 2020-02-03

## 2020-02-03 NOTE — TELEPHONE ENCOUNTER
Spoke with patient and was able to schedule him for appointment tomorrow 2/4 at 830a with Dr Nancy Balderas at [place]   Patient agreed

## 2020-02-03 NOTE — TELEPHONE ENCOUNTER
Patients GI provider:  Dr Wanda Reilly    Number to return call: ([phone]    Reason for call: Pt calling wanting to do a phone consultation with Dr Kira Padilla  Crohns pt  Pt is receiving Remicade and is due to be infused on Friday  Offered pt appt w/ dr Kira Padilla for tomorrow at [address] at [address] [facility] declined to take appt  Pt expressed he is very upset and doesn't understand why cant Dr Wanda Reilly do a phone appt  I relayed both messages from 1/29 from both [place] as well as Dr Wanda Reilly   Pt wants to speak to a supervisor because he doesn't understand why this cant be done  Scheduled procedure/appointment date if applicable: Apt/procedure

## 2020-02-04 ENCOUNTER — OFFICE VISIT (OUTPATIENT)
Dept: GASTROENTEROLOGY | Facility: CLINIC | Age: 50
End: 2020-02-04
Payer: COMMERCIAL

## 2020-02-04 VITALS
DIASTOLIC BLOOD PRESSURE: 80 MMHG | SYSTOLIC BLOOD PRESSURE: 122 MMHG | HEART RATE: 86 BPM | TEMPERATURE: 98.2 F | HEIGHT: 70 IN | WEIGHT: 204.25 LBS | BODY MASS INDEX: 29.24 KG/M2

## 2020-02-04 DIAGNOSIS — K50.819 CROHN'S DISEASE OF SMALL AND LARGE INTESTINES WITH COMPLICATION (HCC): Primary | ICD-10-CM

## 2020-02-04 DIAGNOSIS — R16.1 SPLENOMEGALY: ICD-10-CM

## 2020-02-04 DIAGNOSIS — K21.9 GASTROESOPHAGEAL REFLUX DISEASE WITHOUT ESOPHAGITIS: ICD-10-CM

## 2020-02-04 PROCEDURE — 3008F BODY MASS INDEX DOCD: CPT | Performed by: INTERNAL MEDICINE

## 2020-02-04 PROCEDURE — 3079F DIAST BP 80-89 MM HG: CPT | Performed by: INTERNAL MEDICINE

## 2020-02-04 PROCEDURE — 3074F SYST BP LT 130 MM HG: CPT | Performed by: INTERNAL MEDICINE

## 2020-02-04 PROCEDURE — 99214 OFFICE O/P EST MOD 30 MIN: CPT | Performed by: INTERNAL MEDICINE

## 2020-02-04 PROCEDURE — 1036F TOBACCO NON-USER: CPT | Performed by: INTERNAL MEDICINE

## 2020-02-04 NOTE — LETTER
February 4, 2020     Russell Wilson MD  [id number] [address]    Patient: Kin Mott   YOB: 1970   Date of Visit: 2/4/2020       Dear Dr Jos Germain: Thank you for referring Kin Mott to me for evaluation  Below are my notes for this consultation  If you have questions, please do not hesitate to call me  I look forward to following your patient along with you  Sincerely,        Nilay Rosado MD        CC: No Recipients  Nilay Rosado MD  2/4/2020  8:59 AM  Sign at close encounter  [facility]  Kin Mott 47 y o  male MRN: [id number]            Assessment & Plan:    Pleasant 50-year-old gentle with a history of ileocolonic Crohn's disease status post resection x2, clinically and endoscopically in remission last colonoscopy about 2 years ago  Currently on Remicade  1  Ileocolonic Crohn's disease:  Clinically and endoscopically in remission  -continue Remicade Q 8 weeks  -up-to-date on flu shot  -recommended patient discussed with his PCP, she would have a pneumonia vaccine if not recently completed  -he has up-to-date on DEXA scan  -continue multivitamin  -recommend that he follow up with Dermatology    2  GERD:  Mild reflux symptoms, takes PPI therapy as needed, improved with dietary modification reducing carbohydrates and sugars  3  Splenomegaly:  Mild splenomegaly seen on prior ultrasound about 3 years ago  -we will repeat abdominal ultrasound at this time    I will have the patient follow-up 1 year's time  _____________________________________________________________        CC: Follow-up of Crohn's disease    HPI:  Kin Mott is a 47 y o male who is here for follow-up  As you know this is a pleasant 50-year-old gentle with longstanding history approximately 30 years of ileocolonic Crohn's disease status post ileocolonic resection x2, he has been on Remicade for the past 6 years  Maintain clinically in remission    Last set of laboratory studies in December were normal   Patient is doing well with regular formed bowel movements, denies any ocular, oral, arthralgias symptoms  His weight has increased he is actually trying to diet at this point time  He is receiving home infusions  He denies any fevers, chills, sweats  He is up-to-date on flu shot  Unclear if he has had recent pneumonia vaccine  Has not seen dermatologist in quite some time  Patient has some mild reflux, he notes that with dietary changes his reflux has improved  ROS:  The remainder of the ROS was negative except for the pertinent positives mentioned in HPI  Allergies: Patient has no known allergies  Medications:   Current Outpatient Medications:     inFLIXimab (REMICADE) 100 mg, 440 mg IV infusion every 8 weeks, Disp: 4 4 each, Rfl: 3    losartan (COZAAR) 100 MG tablet, Take 100 mg by mouth daily, Disp: , Rfl: 1    metoprolol succinate (TOPROL-XL) 50 mg 24 hr tablet, Take 1 tablet (50 mg total) by mouth daily, Disp: 90 tablet, Rfl: 1    omeprazole (PriLOSEC) 40 MG capsule, Take by mouth , Disp: , Rfl:     Past Medical History:   Diagnosis Date    Cro[facility]'s disease ([facility] [address] )     GERD (gastroesophageal reflux disease)     Hypertension        Past Surgical History:   Procedure Laterality Date    COLON SURGERY      x 2 bowel resections    COLONOSCOPY      NE COLONOSCOPY FLX DX W/COLLJ SPEC WHEN PFRMD N/A 2/12/2018    Procedure: EGD AND COLONOSCOPY;  Surgeon: Dixon Pate MD;  Location: [place] [address] GI LAB; Service: Gastroenterology       Family History   Problem Relation Age of Onset    Diabetes Mother     Cancer Father         colon    Hypertension Father     Crohn's disease Paternal Uncle         reports that he has quit smoking  He has never used smokeless tobacco  He reports that he drinks alcohol  He reports that he does not use drugs        Physical Exam:    /80 (BP Location: Right arm, Patient Position: Sitting, Cuff Size: Standard) Pulse 86   Temp 98 2 °F (36 8 °C)   Ht 5' 10" (1 778 m)   Wt 92 6 kg (204 lb 4 oz)   BMI 29 31 kg/m²      Gen: wn/wd, NAD , healthy-appearing male  HEENT: anicteric, MMM, no cervical LAD  CVS: RRR, no m/r/g  CHEST: CTA b/l  ABD: +BS, soft, NT,ND, no hepatosplenomegaly, prior surgical scars  EXT: no c/c/e  NEURO: aaox3  SKIN: NO rashes

## 2020-02-04 NOTE — PROGRESS NOTES
[facility]  Silvia Michaud 47 y o  male MRN: [id number]            Assessment & Plan:    Pleasant 56-year-old gentle with a history of ileocolonic Crohn's disease status post resection x2, clinically and endoscopically in remission last colonoscopy about 2 years ago  Currently on Remicade  1  Ileocolonic Crohn's disease:  Clinically and endoscopically in remission  -continue Remicade Q 8 weeks  -up-to-date on flu shot  -recommended patient discussed with his PCP, she would have a pneumonia vaccine if not recently completed  -he has up-to-date on DEXA scan  -continue multivitamin  -recommend that he follow up with Dermatology    2  GERD:  Mild reflux symptoms, takes PPI therapy as needed, improved with dietary modification reducing carbohydrates and sugars  3  Splenomegaly:  Mild splenomegaly seen on prior ultrasound about 3 years ago  -we will repeat abdominal ultrasound at this time    I will have the patient follow-up 1 year's time  _____________________________________________________________        CC: Follow-up of Crohn's disease    HPI:  Silvia Michaud is a 47 y o male who is here for follow-up  As you know this is a pleasant 56-year-old gentle with longstanding history approximately 30 years of ileocolonic Crohn's disease status post ileocolonic resection x2, he has been on Remicade for the past 6 years  Maintain clinically in remission  Last set of laboratory studies in December were normal   Patient is doing well with regular formed bowel movements, denies any ocular, oral, arthralgias symptoms  His weight has increased he is actually trying to diet at this point time  He is receiving home infusions  He denies any fevers, chills, sweats  He is up-to-date on flu shot  Unclear if he has had recent pneumonia vaccine  Has not seen dermatologist in quite some time  Patient has some mild reflux, he notes that with dietary changes his reflux has improved        ROS:  The remainder of the ROS was negative except for the pertinent positives mentioned in HPI  Allergies: Patient has no known allergies  Medications:   Current Outpatient Medications:     inFLIXimab (REMICADE) 100 mg, 440 mg IV infusion every 8 weeks, Disp: 4 4 each, Rfl: 3    losartan (COZAAR) 100 MG tablet, Take 100 mg by mouth daily, Disp: , Rfl: 1    metoprolol succinate (TOPROL-XL) 50 mg 24 hr tablet, Take 1 tablet (50 mg total) by mouth daily, Disp: 90 tablet, Rfl: 1    omeprazole (PriLOSEC) 40 MG capsule, Take by mouth , Disp: , Rfl:     Past Medical History:   Diagnosis Date    Crohn's disease ([address] )     GERD (gastroesophageal reflux disease)     Hypertension        Past Surgical History:   Procedure Laterality Date    COLON SURGERY      x 2 bowel resections    COLONOSCOPY      NE COLONOSCOPY FLX DX W/COLLJ SPEC WHEN PFRMD N/A 2/12/2018    Procedure: EGD AND COLONOSCOPY;  Surgeon: Audie Reyes MD;  Location: [facility]; Service: Gastroenterology       Family History   Problem Relation Age of Onset    Diabetes Mother     Cancer Father         colon    Hypertension Father     Crohn's disease Paternal Uncle         reports that he has quit smoking  He has never used smokeless tobacco  He reports that he drinks alcohol  He reports that he does not use drugs        Physical Exam:    /80 (BP Location: Right arm, Patient Position: Sitting, Cuff Size: Standard)   Pulse 86   Temp 98 2 °F (36 8 °C)   Ht 5' 10" (1 778 m)   Wt 92 6 kg (204 lb 4 oz)   BMI 29 31 kg/m²     Gen: wn/wd, NAD , healthy-appearing male  HEENT: anicteric, MMM, no cervical LAD  CVS: RRR, no m/r/g  CHEST: CTA b/l  ABD: +BS, soft, NT,ND, no hepatosplenomegaly, prior surgical scars  EXT: no c/c/e  NEURO: aaox3  SKIN: NO rashes

## 2020-05-22 PROBLEM — L23.89 ALLERGIC CONTACT DERMATITIS DUE TO OTHER AGENTS: Status: ACTIVE | Noted: 2020-05-22

## 2020-05-22 PROBLEM — L23.7 CONTACT DERMATITIS DUE TO POISON IVY: Status: ACTIVE | Noted: 2020-05-22

## 2020-05-29 PROBLEM — E78.1 HYPERTRIGLYCERIDEMIA: Status: RESOLVED | Noted: 2020-01-02 | Resolved: 2020-05-29

## 2020-05-29 PROBLEM — R53.83 FATIGUE: Status: RESOLVED | Noted: 2019-11-22 | Resolved: 2020-05-29

## 2020-05-29 PROBLEM — A69.20 LYME DISEASE: Status: RESOLVED | Noted: 2019-11-22 | Resolved: 2020-05-29

## 2020-09-03 PROBLEM — L23.7 POISON IVY DERMATITIS: Status: RESOLVED | Noted: 2020-05-22 | Resolved: 2020-09-03

## 2020-10-13 NOTE — TELEPHONE ENCOUNTER
Please send a refill for remicade 440mg every 8 wks 90 7kilograms to [facility] infusion services fax# [phone] 13-Oct-2020 14:43

## 2020-11-02 ENCOUNTER — TELEPHONE (OUTPATIENT)
Dept: GASTROENTEROLOGY | Facility: CLINIC | Age: 50
End: 2020-11-02

## 2020-11-09 DIAGNOSIS — K50.919 CROHN'S DISEASE WITH COMPLICATION, UNSPECIFIED GASTROINTESTINAL TRACT LOCATION (HCC): ICD-10-CM

## 2020-11-09 RX ORDER — INFLIXIMAB 100 MG/10ML
INJECTION, POWDER, LYOPHILIZED, FOR SOLUTION INTRAVENOUS
Qty: 4.4 EACH | Refills: 3 | Status: SHIPPED | OUTPATIENT
Start: 2020-11-09

## 2021-02-25 ENCOUNTER — OFFICE VISIT (OUTPATIENT)
Dept: GASTROENTEROLOGY | Facility: AMBULARY SURGERY CENTER | Age: 51
End: 2021-02-25
Payer: COMMERCIAL

## 2021-02-25 VITALS — HEIGHT: 70 IN | BODY MASS INDEX: 28.63 KG/M2 | RESPIRATION RATE: 16 BRPM | WEIGHT: 200 LBS

## 2021-02-25 DIAGNOSIS — K21.9 GASTROESOPHAGEAL REFLUX DISEASE WITHOUT ESOPHAGITIS: ICD-10-CM

## 2021-02-25 DIAGNOSIS — K50.80 CROHN'S DISEASE OF BOTH SMALL AND LARGE INTESTINE WITHOUT COMPLICATION (HCC): Primary | ICD-10-CM

## 2021-02-25 PROCEDURE — 99214 OFFICE O/P EST MOD 30 MIN: CPT | Performed by: PHYSICIAN ASSISTANT

## 2021-02-25 RX ORDER — SODIUM, POTASSIUM,MAG SULFATES 17.5-3.13G
2 SOLUTION, RECONSTITUTED, ORAL ORAL SEE ADMIN INSTRUCTIONS
Qty: 2 BOTTLE | Refills: 0 | Status: SHIPPED | OUTPATIENT
Start: 2021-02-25 | End: 2021-04-23 | Stop reason: ALTCHOICE

## 2021-02-25 NOTE — ASSESSMENT & PLAN NOTE
Patient reports he has had Crohn's since his 25s, no alarm symptoms at this time but he has not had colonoscopy for 3 years at this point, he does appear elevated colorectal cancer risk given the longstanding nature of his Crohn's as well as history of colonic involvement of disease, he also reports his father had colon cancer    Rule out any underlying adenomatous polyps or malignancy     -will schedule patient for colonoscopy     -patient was advised regarding risks and benefits of the procedure, risks including but not limited to infection, perforation bleeding     -patient was provided instructions and prescription for colonoscopy prep     -continue with Remicade at this time     -patient reports he is planned to have blood work done soon for monitoring of Remicade therapy, including CBC and liver function tests, will follow-up on these results

## 2021-02-25 NOTE — ASSESSMENT & PLAN NOTE
No alarm symptoms at this time, symptomatically well controlled with omeprazole 40 mg daily at this time    Last EGD in early 2018 showed no evidence of Srivastava's     -advised patient regarding dietary and lifestyle modification strategies for the medication of GERD symptoms     -may continue with omeprazole 40 mg daily for now

## 2021-02-25 NOTE — PROGRESS NOTES
Follow-up Note -  [facility]  Adrianne Favre 1970 45 y o  male         Reason:  Follow-up; Crohn's disease    HPI:  78-year-old male with history of ileocolonic Crohn's disease status post resection x2, on Remicade for maintenance, who presents for follow-up  He was last seen in our office in February 2020, about a year ago, at that time appearing to be clinically and endoscopically in remission for about 2 years at that point  Last colonoscopy was in February 2018, showing some inflammation about the ileocolonic anastomosis which did not appear to be secondary to Crohn's disease based on biopsy findings, he was recommended for repeat colonoscopy in 5 years from that time  At this time, patient reports he is feeling well with no concerning changes to his bowel habits at this time, no blood or mucus in the stools, no abdominal pain, reports his appetite has been stable, denies any problems with acid reflux or heartburn (he takes omeprazole 40 mg daily which reports has been helpful for his symptoms)  Denies any difficulty swallowing or any choking episodes  He does report that his father was diagnosed with colon cancer at the age of 79 to 67  He gets lab work regularly through the service that provides is Remicade, I see CBC and LFTs on file from June 2020 which were within normal limits  He says he was diagnosed with Crohn's in his 25s, he was previously maintained on sulfasalazine, does not recall any other maintenance medications he may have been tried on in the past       REVIEW OF SYSTEMS:      CONSTITUTIONAL: Denies any fever, chills, or rigors  Good appetite, and no recent weight loss  HEENT: No earache or tinnitus  Denies hearing loss or visual disturbances  CARDIOVASCULAR: No chest pain or palpitations  RESPIRATORY: Denies any cough, hemoptysis, shortness of breath or dyspnea on exertion  GASTROINTESTINAL: As noted in the History of Present Illness     GENITOURINARY: No problems with urination  Denies any hematuria or dysuria  NEUROLOGIC: No dizziness or vertigo, denies headaches  MUSCULOSKELETAL: Denies any muscle or joint pain  SKIN: Denies skin rashes or itching  ENDOCRINE: Denies excessive thirst  Denies intolerance to heat or cold  PSYCHOSOCIAL: Denies depression or anxiety  Denies any recent memory loss  Past Medical History:   Diagnosis Date    Crohn's disease ([address] )     GERD (gastroesophageal reflux disease)     Hypertension       Past Surgical History:   Procedure Laterality Date    COLON SURGERY      x 2 bowel resections    COLONOSCOPY      SD COLONOSCOPY FLX DX W/COLLJ SPEC WHEN PFRMD N/A 2/12/2018    Procedure: EGD AND COLONOSCOPY;  Surgeon: Rashmi Christie MD;  Location: [facility];   Service: Gastroenterology     Social History     Socioeconomic History    Marital status:      Spouse name: Not on file    Number of children: Not on file    Years of education: Not on file    Highest education level: Not on file   Occupational History    Not on file   Social Needs    Financial resource strain: Not on file    Food insecurity     Worry: Not on file     Inability: Not on file    Transportation needs     Medical: Not on file     Non-medical: Not on file   Tobacco Use    Smoking status: Former Smoker    Smokeless tobacco: Never Used   Substance and Sexual Activity    Alcohol use: Yes     Comment: occas    Drug use: No    Sexual activity: Not on file   Lifestyle    Physical activity     Days per week: Not on file     Minutes per session: Not on file    Stress: Not on file   Relationships    Social connections     Talks on phone: Not on file     Gets together: Not on file     Attends [employer] service: Not on file     Active member of club or organization: Not on file     Attends meetings of clubs or organizations: Not on file     Relationship status: Not on file    Intimate partner violence     Fear of current or ex partner: Not on file Emotionally abused: Not on file     Physically abused: Not on file     Forced sexual activity: Not on file   Other Topics Concern    Not on file   Social History Narrative    Not on file     Family History   Problem Relation Age of Onset    Diabetes Mother     Cancer Father         colon    Hypertension Father     Crohn's disease Paternal Uncle      Patient has no known allergies  Current Outpatient Medications   Medication Sig Dispense Refill    inFLIXimab (REMICADE) 100 mg 440 mg IV infusion every 8 weeks 4 4 each 3    losartan (COZAAR) 100 MG tablet TAKE 1 TABLET BY MOUTH EVERY DAY 90 tablet 1    metoprolol succinate (TOPROL-XL) 50 mg 24 hr tablet TAKE 1 TABLET BY MOUTH EVERY DAY 90 tablet 1    omeprazole (PriLOSEC) 40 MG capsule Take by mouth daily as needed       Na Sulfate-K Sulfate-Mg Sulf (Suprep Bowel Prep Kit) 17 5-3 13-1 6 GM/177ML SOLN Take 2 Bottles by mouth see administration instructions Suprep bowel prep  Please follow the instructions from the office 2 Bottle 0     No current facility-administered medications for this visit  Resp  rate 16, height 5' 10" (1 778 m), weight 90 7 kg (200 lb)  PHYSICAL EXAM:      General Appearance:   Alert, cooperative, no distress, appears stated age    HEENT:   Normocephalic, atraumatic, anicteric      Neck:  Supple, symmetrical, trachea midline, no adenopathy;    thyroid: no enlargement/tenderness/nodules; no carotid  bruit or JVD    Lungs:   Clear to auscultation bilaterally; no rales, rhonchi or wheezing; respirations unlabored    Heart[de-identified]   S1 and S2 normal; regular rate and rhythm; no murmur, rub, or gallop     Abdomen:   Soft, non-tender, non-distended; normal bowel sounds; no masses, no organomegaly    Extremities: No edema, erythema, wounds, rashes   Rectal:   Deferred                      Lab Results   Component Value Date    WBC 7 6 12/28/2019    HGB 16 0 12/28/2019    HCT 45 8 12/28/2019    MCV 94 12/28/2019     12/28/2019 Lab Results   Component Value Date    CALCIUM 8 6 02/27/2017    K 4 2 12/28/2019    CO2 23 12/28/2019     12/28/2019    BUN 17 12/28/2019    CREATININE 1 12 12/28/2019     Lab Results   Component Value Date    ALT 39 12/28/2019    AST 29 12/28/2019    ALKPHOS 93 02/27/2017     No results found for: INR, PROTIME    Dxa Bone Density Spine Hip And Pelvis    Result Date: 3/11/2019  Impression: Bone mineral density within the expected range for age  For premenopausal women and men under the age of 48: Z score greater than -2 0: Within the expected range for age  Z score of -2 0 or lower: Below the expected expected range for age  Workstation performed: [id number]       ASSESSMENT & PLAN:    Crohn's disease of both small and large intestine without complication ([facility] [address] )  Patient reports he has had Crohn's since his 25s, no alarm symptoms at this time but he has not had colonoscopy for 3 years at this point, he does appear elevated colorectal cancer risk given the longstanding nature of his Crohn's as well as history of colonic involvement of disease, he also reports his father had colon cancer  Rule out any underlying adenomatous polyps or malignancy     -will schedule patient for colonoscopy     -patient was advised regarding risks and benefits of the procedure, risks including but not limited to infection, perforation bleeding     -patient was provided instructions and prescription for colonoscopy prep     -continue with Remicade at this time     -patient reports he is planned to have blood work done soon for monitoring of Remicade therapy, including CBC and liver function tests, will follow-up on these results    Gastroesophageal reflux disease without esophagitis  No alarm symptoms at this time, symptomatically well controlled with omeprazole 40 mg daily at this time    Last EGD in early 2018 showed no evidence of Srivastava's     -advised patient regarding dietary and lifestyle modification strategies for the medication of GERD symptoms     -may continue with omeprazole 40 mg daily for now

## 2021-03-16 ENCOUNTER — TELEPHONE (OUTPATIENT)
Dept: GASTROENTEROLOGY | Facility: CLINIC | Age: 51
End: 2021-03-16

## 2021-03-16 NOTE — TELEPHONE ENCOUNTER
Patients GI provider:  Dr Suyapa Lepe     Number to return call: [phone]    Reason for call: Pt calling stating insurance will not cover Na Sulfate-K Sulfate-Mg Sulf  Please send alternative prep      Scheduled procedure/appointment date if applicable: Procedure 3/65/45

## 2021-04-08 DIAGNOSIS — Z23 ENCOUNTER FOR IMMUNIZATION: ICD-10-CM

## 2021-04-09 ENCOUNTER — ANESTHESIA EVENT (OUTPATIENT)
Dept: GASTROENTEROLOGY | Facility: AMBULARY SURGERY CENTER | Age: 51
End: 2021-04-09

## 2021-04-23 ENCOUNTER — ANESTHESIA (OUTPATIENT)
Dept: GASTROENTEROLOGY | Facility: AMBULARY SURGERY CENTER | Age: 51
End: 2021-04-23

## 2021-04-23 ENCOUNTER — HOSPITAL ENCOUNTER (OUTPATIENT)
Dept: GASTROENTEROLOGY | Facility: AMBULARY SURGERY CENTER | Age: 51
Setting detail: OUTPATIENT SURGERY
Discharge: HOME | End: 2021-04-23
Attending: INTERNAL MEDICINE | Admitting: INTERNAL MEDICINE
Payer: COMMERCIAL

## 2021-04-23 VITALS
WEIGHT: 196 LBS | TEMPERATURE: 97 F | HEIGHT: 69 IN | HEART RATE: 79 BPM | BODY MASS INDEX: 29.03 KG/M2 | SYSTOLIC BLOOD PRESSURE: 102 MMHG | DIASTOLIC BLOOD PRESSURE: 73 MMHG | RESPIRATION RATE: 18 BRPM | OXYGEN SATURATION: 98 %

## 2021-04-23 DIAGNOSIS — K50.80 CROHN'S DISEASE OF BOTH SMALL AND LARGE INTESTINE WITHOUT COMPLICATION (HCC): ICD-10-CM

## 2021-04-23 DIAGNOSIS — R16.1 SPLENOMEGALY: ICD-10-CM

## 2021-04-23 DIAGNOSIS — K21.9 GASTROESOPHAGEAL REFLUX DISEASE WITHOUT ESOPHAGITIS: ICD-10-CM

## 2021-04-23 PROCEDURE — 88342 IMHCHEM/IMCYTCHM 1ST ANTB: CPT | Performed by: PATHOLOGY

## 2021-04-23 PROCEDURE — 45380 COLONOSCOPY AND BIOPSY: CPT | Performed by: INTERNAL MEDICINE

## 2021-04-23 PROCEDURE — 88305 TISSUE EXAM BY PATHOLOGIST: CPT | Performed by: PATHOLOGY

## 2021-04-23 RX ORDER — PROPOFOL 10 MG/ML
INJECTION, EMULSION INTRAVENOUS AS NEEDED
Status: DISCONTINUED | OUTPATIENT
Start: 2021-04-23 | End: 2021-04-23

## 2021-04-23 RX ORDER — LIDOCAINE HYDROCHLORIDE 10 MG/ML
INJECTION, SOLUTION EPIDURAL; INFILTRATION; INTRACAUDAL; PERINEURAL AS NEEDED
Status: DISCONTINUED | OUTPATIENT
Start: 2021-04-23 | End: 2021-04-23

## 2021-04-23 RX ORDER — SODIUM CHLORIDE, SODIUM LACTATE, POTASSIUM CHLORIDE, CALCIUM CHLORIDE 600; 310; 30; 20 MG/100ML; MG/100ML; MG/100ML; MG/100ML
INJECTION, SOLUTION INTRAVENOUS CONTINUOUS PRN
Status: DISCONTINUED | OUTPATIENT
Start: 2021-04-23 | End: 2021-04-23

## 2021-04-23 RX ADMIN — PROPOFOL 50 MG: 10 INJECTION, EMULSION INTRAVENOUS at 13:37

## 2021-04-23 RX ADMIN — PROPOFOL 50 MG: 10 INJECTION, EMULSION INTRAVENOUS at 13:28

## 2021-04-23 RX ADMIN — PROPOFOL 50 MG: 10 INJECTION, EMULSION INTRAVENOUS at 13:34

## 2021-04-23 RX ADMIN — PROPOFOL 100 MG: 10 INJECTION, EMULSION INTRAVENOUS at 13:27

## 2021-04-23 RX ADMIN — PROPOFOL 50 MG: 10 INJECTION, EMULSION INTRAVENOUS at 13:31

## 2021-04-23 RX ADMIN — PROPOFOL 50 MG: 10 INJECTION, EMULSION INTRAVENOUS at 13:40

## 2021-04-23 RX ADMIN — LIDOCAINE HYDROCHLORIDE 50 MG: 10 INJECTION, SOLUTION EPIDURAL; INFILTRATION; INTRACAUDAL at 13:27

## 2021-04-23 RX ADMIN — SODIUM CHLORIDE, SODIUM LACTATE, POTASSIUM CHLORIDE, AND CALCIUM CHLORIDE: .6; .31; .03; .02 INJECTION, SOLUTION INTRAVENOUS at 13:22

## 2021-04-23 NOTE — ANESTHESIA POSTPROCEDURE EVALUATION
Post-Op Assessment Note    CV Status:  Stable  Pain Score: 0    Pain management: adequate     Mental Status:  Somnolent   Hydration Status:  Stable   PONV Controlled:  None   Airway Patency:  Patent      Post Op Vitals Reviewed: Yes      Staff: CRNA         No complications documented      /80 (04/23/21 1348)    Temp (!) 97 °F (36 1 °C) (04/23/21 1348)    Pulse 65(sr) (04/23/21 1348)   Resp 18 (04/23/21 1348)    SpO2 98 % (04/23/21 1348)

## 2021-04-23 NOTE — H&P
History and Physical -  [facility]  Sharyle Small 45 y o  male MRN: [id number]    HPI: Sharyle Small is a 45y o  year old male who presents with hx of crohns disease  Review of Systems    Historical Information   Past Medical History:   Diagnosis Date    Colon polyp     Crohn's disease ([address] )     GERD (gastroesophageal reflux disease)     Hypertension      Past Surgical History:   Procedure Laterality Date    APPENDECTOMY      COLON SURGERY      x 2 bowel resections    COLONOSCOPY      HAND SURGERY      NE COLONOSCOPY FLX DX W/COLLJ SPEC WHEN PFRMD N/A 2/12/2018    Procedure: EGD AND COLONOSCOPY;  Surgeon: Link Currie MD;  Location: [facility]; Service: Gastroenterology     Social History   Social History     Substance and Sexual Activity   Alcohol Use Yes    Frequency: 2-3 times a week    Comment: occas     Social History     Substance and Sexual Activity   Drug Use No     Social History     Tobacco Use   Smoking Status Former Smoker   Smokeless Tobacco Never Used     Family History   Problem Relation Age of Onset    Diabetes Mother     Cancer Father         colon    Hypertension Father     Crohn's disease Paternal Uncle        Meds/Allergies     (Not in a hospital admission)      No Known Allergies    Objective     /91   Pulse 61   Temp 97 5 °F (36 4 °C) (Temporal)   Resp 18   Ht 5' 9" (1 753 m)   Wt 88 9 kg (196 lb)   SpO2 100%   BMI 28 94 kg/m²       PHYSICAL EXAM    Gen: NAD  CV: RRR  CHEST: Clear  ABD: soft, NT/ND  EXT: no edema  Neuro: AAO      ASSESSMENT/PLAN:  This is a 45y o  year old male here for  crohns disease         PLAN:   Procedure: colonoscopy

## 2021-04-23 NOTE — DISCHARGE INSTRUCTIONS
Colonoscopy   WHAT YOU NEED TO KNOW:   A colonoscopy is a procedure to examine the inside of your colon (intestine) with a scope  Polyps or tissue growths may have been removed during your colonoscopy  It is normal to feel bloated and to have some abdominal discomfort  You should be passing gas  If you have hemorrhoids or you had polyps removed, you may have a small amount of bleeding  DISCHARGE INSTRUCTIONS:   Seek care immediately if:    You have sudden, severe abdominal pain   You have problems swallowing   You have a large amount of black, sticky bowel movements or blood in your bowel movements   You have sudden trouble breathing   You feel weak, lightheaded, or faint or your heart beats faster than normal for you  Contact your healthcare provider if:    You have a fever and chills   You have nausea or are vomiting   Your abdomen is bloated or feels full and hard   You have abdominal pain   You have black, sticky bowel movements or blood in your bowel movements   You have not had a bowel movement for 3 days after your procedure   You have rash or hives   You have questions or concerns about your procedure  Activity:    Do not lift, strain, or run for 24 hours after your procedure   Rest after your procedure  You have been given medicine to relax you  Do not drive or make important decisions until the day after your procedure  Return to your normal activity as directed   Relieve gas and discomfort from bloating by lying on your right side with a heating pad on your abdomen  You may need to take short walks to help the gas move out  Eat small meals until bloating is relieved  Follow up with your healthcare provider as directed: Write down your questions so you remember to ask them during your visits  If you take a blood thinner, please review the specific instructions from your endoscopist about when you should resume it   These can be found in the Recommendation and Your Medication list sections of this After Visit Summary  Colorectal Polyps   WHAT YOU NEED TO KNOW:   Colorectal polyps are small growths of tissue in the lining of the colon and rectum  Most polyps are hyperplastic polyps and are usually benign (noncancerous)  Certain types of polyps, called adenomatous polyps, may turn into cancer  DISCHARGE INSTRUCTIONS:   Follow up with your healthcare provider or gastroenterologist as directed: You may need to return for more tests, such as another colonoscopy  Write down your questions so you remember to ask them during your visits  Reduce your risk for colorectal polyps:   · Eat a variety of healthy foods:  Healthy foods include fruit, vegetables, whole-grain breads, low-fat dairy products, beans, lean meat, and fish  Ask if you need to be on a special diet  · Maintain a healthy weight:  Ask your healthcare provider if you need to lose weight and how much you need to lose  Ask for help with a weight loss program     · Exercise:  Begin to exercise slowly and do more as you get stronger  Talk with your healthcare provider before you start an exercise program      · Limit alcohol:  Your risk for polyps increases the more you drink  · Do not smoke: If you smoke, it is never too late to quit  Ask for information about how to stop  For support and more information:   · [facility] ([facility]) [address], [place] [address]  Phone: 7- [phone]  Web Address: www digestive  niddk nih gov    Contact your healthcare provider or gastroenterologist if:   · You have a fever  · You have chills, a cough, or feel weak and achy  · You have abdominal pain that does not go away or gets worse after you take medicine  · Your abdomen is swollen  · You are losing weight without trying  · You have questions or concerns about your condition or care      Seek care immediately or call 911 if:   · You have sudden shortness of breath  · You have a fast heart rate, fast breathing, or are too dizzy to stand up  · You have severe abdominal pain  · You see blood in your bowel movement  © Copyright [address] Information is for End User's use only and may not be sold, redistributed or otherwise used for commercial purposes  All illustrations and images included in CareNotes® are the copyrighted property of [employer]  or [facility]   The above information is an  only  It is not intended as medical advice for individual conditions or treatments  Talk to your doctor, nurse or pharmacist before following any medical regimen to see if it is safe and effective for you

## 2021-04-23 NOTE — ANESTHESIA PREPROCEDURE EVALUATION
Procedure:  COLONOSCOPY    Relevant Problems   CARDIO   (+) Hypercholesteremia   (+) Hypertension      GI/HEPATIC   (+) GERD (gastroesophageal reflux disease)   (+) Gastroesophageal reflux disease without esophagitis      Other   (+) Crohn's disease (HCC)   (+) Splenomegaly        Physical Exam    Airway    Mallampati score: III  TM Distance: >3 FB  Neck ROM: full     Dental   No notable dental hx implants,     Cardiovascular  Cardiovascular exam normal    Pulmonary  Pulmonary exam normal     Other Findings        Anesthesia Plan  ASA Score- 2     Anesthesia Type- IV sedation with anesthesia with ASA Monitors  Additional Monitors:   Airway Plan:           Plan Factors-Exercise tolerance (METS): >4 METS  Chart reviewed  Patient summary reviewed  Patient is not a current smoker  Patient did not smoke on day of surgery  Induction- intravenous  Postoperative Plan-     Informed Consent- Anesthetic plan and risks discussed with patient  I personally reviewed this patient with the CRNA  Discussed and agreed on the Anesthesia Plan with the CRNA  Steve Tello

## 2021-04-30 DIAGNOSIS — K50.80 CROHN'S DISEASE OF BOTH SMALL AND LARGE INTESTINE WITHOUT COMPLICATION (HCC): Primary | ICD-10-CM

## 2021-05-04 ENCOUNTER — TELEPHONE (OUTPATIENT)
Dept: GASTROENTEROLOGY | Facility: AMBULARY SURGERY CENTER | Age: 51
End: 2021-05-04

## 2021-05-04 NOTE — TELEPHONE ENCOUNTER
Informed pt of results, pt verbalized understanding  Pt will have blood work done 1 week before  Advised to call with any questions or concerns  Please schedule pt for office visit in 2 months     Thank you

## 2021-05-04 NOTE — TELEPHONE ENCOUNTER
----- Message from Jennifer Neri MD sent at 4/30/2021  2:40 PM EDT -----  Please inform the patient biopsies from recent colonoscopy shows some inflammation at his anastomosis  This may be due to the surgical anastomosis versus recurrence of his Crohn's disease  I have ordered laboratory studies including stool test for fecal calprotectin  I have also ordered Remicade levels  Please have him have all the blood work and stool tests about 1 week prior to his next Remicade infusion  Please schedule office visit in 2 months time to further discuss  Please have him call with any questions or concerns

## 2021-06-11 ENCOUNTER — TELEPHONE (OUTPATIENT)
Dept: INTERNAL MEDICINE CLINIC | Facility: CLINIC | Age: 51
End: 2021-06-11

## 2021-08-10 ENCOUNTER — TELEPHONE (OUTPATIENT)
Dept: INTERNAL MEDICINE CLINIC | Facility: CLINIC | Age: 51
End: 2021-08-10

## 2021-08-10 NOTE — TELEPHONE ENCOUNTER
Called pt to reschedule his missed appt from July  He was on Vacation and will call back when he returns

## 2021-09-13 ENCOUNTER — TELEPHONE (OUTPATIENT)
Dept: INTERNAL MEDICINE CLINIC | Facility: CLINIC | Age: 51
End: 2021-09-13

## 2021-10-06 NOTE — TELEPHONE ENCOUNTER
-- DO NOT REPLY / DO NOT REPLY ALL --  -- Message is from the [facility]--    General Patient Message      Reason for Call: Patient's spouse is calling in regarding some paperwork for the patient he states that [facility] has faxed over some [facility] paperwork last month and they still haven't received anything from the office yet     Caller Information       Type Contact Phone    10/06/2021 01:27 PM CDT Phone (Incoming) Fareed ALLEN (Spouse) [phone]          Alternative phone number: n/a     Turnaround time given to caller:   \"This message will be sent to [state Provider's name]. The clinical team will fulfill your request as soon as they review your message.\"     Called and Tried to make apt for patient he travels for work so he will call back to next week when he's aware of his schedule

## 2022-02-22 ENCOUNTER — TELEPHONE (OUTPATIENT)
Dept: INTERNAL MEDICINE CLINIC | Facility: CLINIC | Age: 52
End: 2022-02-22

## 2022-02-22 NOTE — TELEPHONE ENCOUNTER
Has moved to [place] [facility] [place] [facility]\""   Do you have any recommendations for GP in the area

## 2022-03-18 ENCOUNTER — TELEPHONE (OUTPATIENT)
Dept: GASTROENTEROLOGY | Facility: CLINIC | Age: 52
End: 2022-03-18

## 2022-03-18 NOTE — TELEPHONE ENCOUNTER
Patients GI provider:  Dr Rosey Eli    Number to return call: [phone]    Reason for call: Marcel Green  from [address] [place] [address] calling for pt's last office notes  Fax# [phone]      Scheduled procedure/appointment date if applicable: N/A

## 2022-03-21 ENCOUNTER — OFFICE VISIT (OUTPATIENT)
Dept: FAMILY MEDICINE CLINIC | Facility: CLINIC | Age: 52
End: 2022-03-21
Payer: COMMERCIAL

## 2022-03-21 VITALS
BODY MASS INDEX: 31.7 KG/M2 | OXYGEN SATURATION: 98 % | HEART RATE: 59 BPM | WEIGHT: 214 LBS | RESPIRATION RATE: 18 BRPM | DIASTOLIC BLOOD PRESSURE: 80 MMHG | TEMPERATURE: 98.3 F | HEIGHT: 69 IN | SYSTOLIC BLOOD PRESSURE: 114 MMHG

## 2022-03-21 DIAGNOSIS — Z13.1 SCREENING FOR DIABETES MELLITUS: ICD-10-CM

## 2022-03-21 DIAGNOSIS — K50.80 CROHN'S DISEASE OF BOTH SMALL AND LARGE INTESTINE WITHOUT COMPLICATION (HCC): Primary | ICD-10-CM

## 2022-03-21 DIAGNOSIS — K21.9 GASTROESOPHAGEAL REFLUX DISEASE WITHOUT ESOPHAGITIS: ICD-10-CM

## 2022-03-21 DIAGNOSIS — I10 PRIMARY HYPERTENSION: ICD-10-CM

## 2022-03-21 DIAGNOSIS — I10 ESSENTIAL HYPERTENSION: ICD-10-CM

## 2022-03-21 DIAGNOSIS — E78.00 HYPERCHOLESTEREMIA: ICD-10-CM

## 2022-03-21 DIAGNOSIS — Z13.29 SCREENING FOR THYROID DISORDER: ICD-10-CM

## 2022-03-21 PROCEDURE — 99204 OFFICE O/P NEW MOD 45 MIN: CPT | Performed by: FAMILY MEDICINE

## 2022-03-21 PROCEDURE — 3725F SCREEN DEPRESSION PERFORMED: CPT | Performed by: FAMILY MEDICINE

## 2022-03-21 RX ORDER — METOPROLOL SUCCINATE 50 MG/1
50 TABLET, EXTENDED RELEASE ORAL DAILY
Qty: 90 TABLET | Refills: 1 | Status: SHIPPED | OUTPATIENT
Start: 2022-03-21

## 2022-03-21 RX ORDER — LOSARTAN POTASSIUM 100 MG/1
100 TABLET ORAL DAILY
Qty: 90 TABLET | Refills: 1 | Status: SHIPPED | OUTPATIENT
Start: 2022-03-21

## 2022-03-21 NOTE — PROGRESS NOTES
Assessment/Plan:   1  Essential hypertension  Patient's blood pressure appears stable today  Continue with routine home monitoring as well as his current treatment with losartan and metoprolol   - losartan (COZAAR) 100 MG tablet; Take 1 tablet (100 mg total) by mouth daily  Dispense: 90 tablet; Refill: 1  - metoprolol succinate (TOPROL-XL) 50 mg 24 hr tablet; Take 1 tablet (50 mg total) by mouth daily  Dispense: 90 tablet; Refill: 1    2  Crohn's disease of both small and large intestine without complication ([address] )  Stable  He denies any recent exacerbations  He does follow with Gastroenterology  Continue with his regular follow-up as well as his treatment with Remicade  - CBC; Future  - Comprehensive metabolic panel; Future  - CBC  - Comprehensive metabolic panel    3  Gastroesophageal reflux disease without esophagitis  Stable  Will continue with dietary trigger avoidance as well as his current treatment with omeprazole  4  Hypercholesteremia  Check lipid panel  Continue with a strict low-fat/low-cholesterol diet  Follow-up in 6 months  - Lipid Panel with Direct LDL reflex; Future  - Lipid Panel with Direct LDL reflex        BMI Counseling: Body mass index is 31 6 kg/m²  The BMI is above normal  Nutrition recommendations include decreasing portion sizes, encouraging healthy choices of fruits and vegetables, decreasing fast food intake, consuming healthier snacks and limiting drinks that contain sugar  Exercise recommendations include moderate physical activity 150 minutes/week and exercising 3-5 times per week  No pharmacotherapy was ordered  Patient referred to PCP  Rationale for BMI follow-up plan is due to patient being overweight or obese  Depression Screening and Follow-up Plan: Patient was screened for depression during today's encounter  They screened negative with a PHQ-2 score of 0            Diagnoses and all orders for this visit:    Essential hypertension          Subjective:       Chief Complaint   Patient presents with    [facility]      Patient ID: Rodríguez Singh is a 45 y o  male presents today as a new patient to [facility]     He has hypertension, Crohn's disease, GERD, as well as dyslipidemia  He has been taking his medications regularly  He denies adverse reactions with medications  He has not had any recent flare-ups of his chronic disease  He does follow up with Gastroenterology regularly  HPI    Review of Systems   Constitutional: Negative for activity change, chills, fatigue and fever  HENT: Negative for congestion, ear pain, sinus pressure and sore throat  Eyes: Negative for redness, itching and visual disturbance  Respiratory: Negative for cough and shortness of breath  Cardiovascular: Negative for chest pain and palpitations  Gastrointestinal: Negative for abdominal pain, diarrhea and nausea  Endocrine: Negative for cold intolerance and heat intolerance  Genitourinary: Negative for dysuria, flank pain and frequency  Musculoskeletal: Negative for arthralgias, back pain, gait problem and myalgias  Skin: Negative for color change  Allergic/Immunologic: Negative for environmental allergies  Neurological: Negative for dizziness, numbness and headaches  Psychiatric/Behavioral: Negative for behavioral problems and sleep disturbance  The following portions of the patient's history were reviewed and updated as appropriate : past family history, past medical history, past social history and past surgical history      Current Outpatient Medications:     inFLIXimab (REMICADE) 100 mg, 440 mg IV infusion every 8 weeks, Disp: 4 4 each, Rfl: 3    losartan (COZAAR) 100 MG tablet, TAKE 1 TABLET BY MOUTH EVERY DAY, Disp: 90 tablet, Rfl: 1    metoprolol succinate (TOPROL-XL) 50 mg 24 hr tablet, TAKE 1 TABLET BY MOUTH EVERY DAY, Disp: 90 tablet, Rfl: 1    omeprazole (PriLOSEC) 40 MG capsule, Take by mouth daily as needed , Disp: , Rfl: Objective:         Vitals:    03/21/22 1207   BP: 114/80   BP Location: Left arm   Patient Position: Sitting   Cuff Size: Large   Pulse: 59   Resp: 18   Temp: 98 3 °F (36 8 °C)   TempSrc: Tympanic   SpO2: 98%   Weight: 97 1 kg (214 lb)   Height: 5' 9" (1 753 m)     Physical Exam  Vitals reviewed  Constitutional:       Appearance: He is well-developed  HENT:      Head: Normocephalic and atraumatic  Nose: Nose normal       Mouth/Throat:      Pharynx: No oropharyngeal exudate  Eyes:      General: No scleral icterus  Right eye: No discharge  Left eye: No discharge  Pupils: Pupils are equal, round, and reactive to light  Neck:      Trachea: No tracheal deviation  Cardiovascular:      Rate and Rhythm: Normal rate and regular rhythm  Pulses:           Dorsalis pedis pulses are 2+ on the right side and 2+ on the left side  Posterior tibial pulses are 2+ on the right side and 2+ on the left side  Heart sounds: Normal heart sounds  No murmur heard  No friction rub  No gallop  Pulmonary:      Effort: Pulmonary effort is normal  No respiratory distress  Breath sounds: Normal breath sounds  No wheezing or rales  Abdominal:      General: Bowel sounds are normal  There is no distension  Palpations: Abdomen is soft  Tenderness: There is no abdominal tenderness  There is no guarding or rebound  Musculoskeletal:         General: Normal range of motion  Cervical back: Normal range of motion and neck supple  Lymphadenopathy:      Head:      Right side of head: No submental or submandibular adenopathy  Left side of head: No submental or submandibular adenopathy  Cervical: No cervical adenopathy  Right cervical: No superficial, deep or posterior cervical adenopathy  Left cervical: No superficial, deep or posterior cervical adenopathy  Skin:     General: Skin is warm and dry  Findings: No erythema     Neurological:      Mental Status: He is alert and oriented to person, place, and time  Cranial Nerves: No cranial nerve deficit  Sensory: No sensory deficit  Psychiatric:         Mood and Affect: Mood is not anxious or depressed  Speech: Speech normal          Behavior: Behavior normal          Thought Content:  Thought content normal          Judgment: Judgment normal

## 2022-03-23 ENCOUNTER — OFFICE VISIT (OUTPATIENT)
Dept: GASTROENTEROLOGY | Facility: CLINIC | Age: 52
End: 2022-03-23
Payer: COMMERCIAL

## 2022-03-23 VITALS
TEMPERATURE: 98.1 F | SYSTOLIC BLOOD PRESSURE: 142 MMHG | HEART RATE: 84 BPM | OXYGEN SATURATION: 98 % | HEIGHT: 69 IN | BODY MASS INDEX: 31.7 KG/M2 | DIASTOLIC BLOOD PRESSURE: 85 MMHG | WEIGHT: 214 LBS

## 2022-03-23 DIAGNOSIS — R13.19 ESOPHAGEAL DYSPHAGIA: ICD-10-CM

## 2022-03-23 DIAGNOSIS — K21.9 GASTROESOPHAGEAL REFLUX DISEASE, UNSPECIFIED WHETHER ESOPHAGITIS PRESENT: Primary | ICD-10-CM

## 2022-03-23 DIAGNOSIS — K50.819 CROHN'S DISEASE OF SMALL AND LARGE INTESTINES WITH COMPLICATION (HCC): ICD-10-CM

## 2022-03-23 PROCEDURE — 3008F BODY MASS INDEX DOCD: CPT | Performed by: PHYSICIAN ASSISTANT

## 2022-03-23 PROCEDURE — 99214 OFFICE O/P EST MOD 30 MIN: CPT | Performed by: PHYSICIAN ASSISTANT

## 2022-03-23 PROCEDURE — 1036F TOBACCO NON-USER: CPT | Performed by: PHYSICIAN ASSISTANT

## 2022-03-23 RX ORDER — OMEPRAZOLE 20 MG/1
20 CAPSULE, DELAYED RELEASE ORAL DAILY
Qty: 90 CAPSULE | Refills: 1 | Status: SHIPPED | OUTPATIENT
Start: 2022-03-23 | End: 2022-03-24

## 2022-03-23 RX ORDER — FAMOTIDINE 20 MG/1
20 TABLET, FILM COATED ORAL
Qty: 90 TABLET | Refills: 1 | Status: SHIPPED | OUTPATIENT
Start: 2022-03-23

## 2022-03-23 NOTE — PROGRESS NOTES
Assessment and Plan    #1  Crohn's disease of large and small intestine s/p resection x2: currently clinically in remission, last colonoscopy 1 year ago with anastomotic ulcer (unclear if this was related to active crohn's versus anastomosis with NSAID use)  Never followed up after    -continue remicade every 8 weeks  -PCP ordered CBC, CMP, TSH  I added CRP, remicade level which he has not completed yet and vit D level to his labs  Advised to try to get done 1 week prior to remicade infusion  -had DEXA scan in 2019, no recent steroid use  -saw dermatology recently per patient    -discussed stopping NSAID use and only using tylenol as this could increase risk of anastomotic ulcer and worsen GERD symptoms  -repeat colonoscopy in 2026, sooner if needed  -follow up in 6 months  -call with any flare symptoms in the meantime    #2  GERD with esophageal dysphagia: has hx of EGD in 2018 with esophagitis, on PPI OTC once daily  -ordered prescription of omeprazole  -add pepcid before bedtime for night time reflux symptoms  -discussed repeating EGD and assessing for esophagitis and need for dilation, patient would like to try addition of pepcid and stopping NSAIDs first to see if symptoms improve as his schedule is very busy in the next several months  -advised him to call with any worsening symptoms  -anti-reflux diet and measures    follow up in 6 months with Dr Angely Gaston    --------------------------------------------------------------------------------------------------------------------    Chief Complaint: f/u Crohn's     HPI: Mark Montalvo is a 45 y o  male with a history of Crohn's of the small and large intestine s/p two bowel resections, GERD, HTN, and elevated cholesterol who presents today for follow up for crohn's  He was last seen in the office 1 year ago and had a colonoscopy shortly after that  At that time he had an anastomotic ulcer and bx showed inflammation but it was unclear if this was related to active Crohn's  He was advised to have fecal calprotectin and remicade levels performed which he has not done  He recently saw his PCP a few days ago and basic labs were ordered  Currently he denies any symptoms related to his Crohn's  Denies rectal bleeding, constipation, diarrhea, mucus in stool, abdominal pain  Appetite is good and no weight loss  He does have reflux for which he takes omeprazole once daily OTC  He gets symptoms at night a few times a week  He denies eating late  He does admit to using frequent NSAIDs, sometimes daily, for aches and pains  He denies nausea or vomiting  He denies tobacco use and occasionally drinks alcohol  He he reports occasional esophageal dysphagia which has been present for awhile but not worsening  He had an EGD in 2018 with esophagitis but no strictures or dilation  He had a DEXA scan in 2019 and recently saw dermatology         Review of Systems:   General: negative for fatigue, fever, night sweats or unexpected weight loss  Psychological: negative for anxiety or depression  Ophthalmic: negative for blurry vision or scleral icterus  ENT: negative for headaches, oral lesions, sore throat, vocal changes or dysphagia  Hematological and Lymphatic: negative for pallor or swollen lymph nodes  Respiratory: negative for cough, shortness of breath or wheezing  Cardiovascular: negative for chest pain, edema or murmur  Gastrointestinal: as mentioned in HPI  Genito-Urinary: negative for dysuria or incontinence  Musculoskeletal: negative for joint pain, joint stiffness or joint swelling  Dermatological: negative for pruritus, rash, or jaundice    Current Medications  Current Outpatient Medications   Medication Sig Dispense Refill    inFLIXimab (REMICADE) 100 mg 440 mg IV infusion every 8 weeks 4 4 each 3    losartan (COZAAR) 100 MG tablet Take 1 tablet (100 mg total) by mouth daily 90 tablet 1    metoprolol succinate (TOPROL-XL) 50 mg 24 hr tablet Take 1 tablet (50 mg total) by mouth daily 90 tablet 1    famotidine (PEPCID) 20 mg tablet Take 1 tablet (20 mg total) by mouth daily at bedtime as needed for heartburn 90 tablet 1    omeprazole (PriLOSEC) 20 mg delayed release capsule Take 1 capsule (20 mg total) by mouth daily 90 capsule 1     No current facility-administered medications for this visit  Past Medical History  Past Medical History:   Diagnosis Date    Allergic     Anemia     Colon polyp     Crohn's disease ([address] )     GERD (gastroesophageal reflux disease)     Hypertension     Inflammatory bowel disease        Past Surgical History  Past Surgical History:   Procedure Laterality Date    APPENDECTOMY      COLON SURGERY      x 2 bowel resections    COLONOSCOPY      HAND SURGERY      SD COLONOSCOPY FLX DX W/COLLJ SPEC WHEN PFRMD N/A 2/12/2018    Procedure: EGD AND COLONOSCOPY;  Surgeon: Janessa Ko MD;  Location: [facility]; Service: Gastroenterology       Past Social History   Social History     Socioeconomic History    Marital status: /Civil Union     Spouse name: None    Number of children: None    Years of education: None    Highest education level: None   Occupational History    None   Tobacco Use    Smoking status: Former Smoker     Packs/day: 0 00     Years: 10 00     Pack years: 0 00     Types: Cigarettes    Smokeless tobacco: Never Used   Vaping Use    Vaping Use: Never used   Substance and Sexual Activity    Alcohol use:  Yes     Alcohol/week: 0 0 standard drinks     Comment: occas    Drug use: No    Sexual activity: Yes     Partners: Female     Birth control/protection: Ring   Other Topics Concern    None   Social History Narrative    None     Social Determinants of Health     Financial Resource Strain: Not on file   Food Insecurity: Not on file   Transportation Needs: Not on file   Physical Activity: Not on file   Stress: Not on file   Social Connections: Not on file   Intimate Partner Violence: Not on file   Housing Stability: Not on file       The following portions of the patient's history were reviewed and updated as appropriate: allergies, current medications, past family history, past medical history, past social history, past surgical history and problem list     Vital Signs  Vitals:    03/23/22 0801   BP: 142/85   BP Location: Right arm   Patient Position: Sitting   Cuff Size: Standard   Pulse: 84   Temp: 98 1 °F (36 7 °C)   SpO2: 98%   Weight: 97 1 kg (214 lb)   Height: 5' 9" (1 753 m)       Physical Exam:  General appearance: alert, cooperative, no distress  HEENT: normocephalic, anicteric, no eye erythema or discharge, no oropharyngeal thrush  Neck: supple, trachea midline, no adenopathy  Lungs: CTA b/l, no rales, rhonchi, or wheezing, unlabored respirations  Heart: RRR, no murmur, rubs, or gallops  Abdomen: soft, non-tender, non-distended, normal bowel sounds, no masses or organomegaly  Rectal: deferred  Extremities: no cyanosis, clubbing, or edema  Musculoskeletal: normal gait  Skin: color and texture normal, no jaundice, no rashes or lesions  Psychiatric: alert and oriented, normal affect and behavior

## 2022-07-18 ENCOUNTER — TELEPHONE (OUTPATIENT)
Dept: FAMILY MEDICINE CLINIC | Facility: CLINIC | Age: 52
End: 2022-07-18

## 2022-07-18 NOTE — TELEPHONE ENCOUNTER
Pt called stating that someone from our office called he was on a zoom and said they would call back  He didn't catch the name  AF gave me a list of reschedules that had his name on

## 2022-08-11 ENCOUNTER — OFFICE VISIT (OUTPATIENT)
Dept: FAMILY MEDICINE CLINIC | Facility: CLINIC | Age: 52
End: 2022-08-11
Payer: COMMERCIAL

## 2022-08-11 VITALS
OXYGEN SATURATION: 96 % | SYSTOLIC BLOOD PRESSURE: 110 MMHG | WEIGHT: 212.2 LBS | DIASTOLIC BLOOD PRESSURE: 78 MMHG | HEIGHT: 69 IN | TEMPERATURE: 98.7 F | BODY MASS INDEX: 31.43 KG/M2 | HEART RATE: 60 BPM

## 2022-08-11 DIAGNOSIS — E78.00 HYPERCHOLESTEREMIA: ICD-10-CM

## 2022-08-11 DIAGNOSIS — Z23 NEED FOR SHINGLES VACCINE: ICD-10-CM

## 2022-08-11 DIAGNOSIS — Z13.29 SCREENING FOR THYROID DISORDER: ICD-10-CM

## 2022-08-11 DIAGNOSIS — K21.9 GASTROESOPHAGEAL REFLUX DISEASE WITHOUT ESOPHAGITIS: ICD-10-CM

## 2022-08-11 DIAGNOSIS — K50.80 CROHN'S DISEASE OF BOTH SMALL AND LARGE INTESTINE WITHOUT COMPLICATION (HCC): ICD-10-CM

## 2022-08-11 DIAGNOSIS — Z13.1 SCREENING FOR DIABETES MELLITUS: ICD-10-CM

## 2022-08-11 DIAGNOSIS — I10 ESSENTIAL HYPERTENSION: Primary | ICD-10-CM

## 2022-08-11 PROCEDURE — 90471 IMMUNIZATION ADMIN: CPT | Performed by: FAMILY MEDICINE

## 2022-08-11 PROCEDURE — 90750 HZV VACC RECOMBINANT IM: CPT | Performed by: FAMILY MEDICINE

## 2022-08-11 PROCEDURE — 99214 OFFICE O/P EST MOD 30 MIN: CPT | Performed by: FAMILY MEDICINE

## 2022-08-11 NOTE — PROGRESS NOTES
Assessment/Plan:   1  Essential hypertension  Stable  Continue with routine home monitoring  Continue as well with current treatment of metoprolol as well as his losartan  2  Hypercholesteremia  Unclear of recent control  Will recheck fasting lipid panel  Continue with a strict low-fat/low-cholesterol diet  - Comprehensive metabolic panel; Future  - Lipid Panel with Direct LDL reflex; Future  - Comprehensive metabolic panel  - Lipid Panel with Direct LDL reflex    3  Crohn's disease of both small and large intestine without complication ([address] )  Stable  He denies any recent exacerbations of his Crohn's  Will continue with current treatment of Remicade as well as his regular follow-up with Gastroenterology  - CBC; Future  - CBC    4  Gastroesophageal reflux disease without esophagitis  Denies any recent exacerbations  Will continue with dietary trigger avoidance as well as current treatment with Pepcid as well as his Nexium  Follow up with patient in 6 months  5  Need for shingles vaccine  - Zoster Vaccine Recombinant IM           Diagnoses and all orders for this visit:    Essential hypertension    Hypercholesteremia  -     Comprehensive metabolic panel; Future  -     Lipid Panel with Direct LDL reflex; Future  -     Comprehensive metabolic panel  -     Lipid Panel with Direct LDL reflex    Crohn's disease of both small and large intestine without complication (HCC)  -     CBC; Future  -     CBC    Gastroesophageal reflux disease without esophagitis    Screening for diabetes mellitus  -     Hemoglobin A1C; Future  -     Hemoglobin A1C    Screening for thyroid disorder  -     TSH, 3rd generation with Free T4 reflex; Future  -     TSH, 3rd generation with Free T4 reflex          Subjective:       Chief Complaint   Patient presents with    Follow-up     6 month follow up      Patient ID: Alanis Bolton  is a 45 y o  male presents today for a follow-up on his chronic conditions    He has hypertension, dyslipidemia, Crohn's disease, GERD  He has been taking his medications regularly  He denies adverse reactions with medications  He denies any recent exacerbations of his Crohn's disease  He    HPI    Review of Systems   Constitutional: Negative for activity change, chills, fatigue and fever  HENT: Negative for congestion, ear pain, sinus pressure and sore throat  Eyes: Negative for redness, itching and visual disturbance  Respiratory: Negative for cough and shortness of breath  Cardiovascular: Negative for chest pain and palpitations  Gastrointestinal: Negative for abdominal pain, diarrhea and nausea  Endocrine: Negative for cold intolerance and heat intolerance  Genitourinary: Negative for dysuria, flank pain and frequency  Musculoskeletal: Negative for arthralgias, back pain, gait problem and myalgias  Skin: Negative for color change  Allergic/Immunologic: Negative for environmental allergies  Neurological: Negative for dizziness, numbness and headaches  Psychiatric/Behavioral: Negative for behavioral problems and sleep disturbance  The following portions of the patient's history were reviewed and updated as appropriate : past family history, past medical history, past social history and past surgical history      Current Outpatient Medications:     esomeprazole (NexIUM) 20 mg capsule, Take 1 capsule (20 mg total) by mouth daily in the early morning, Disp: 90 capsule, Rfl: 2    famotidine (PEPCID) 20 mg tablet, Take 1 tablet (20 mg total) by mouth daily at bedtime as needed for heartburn, Disp: 90 tablet, Rfl: 1    inFLIXimab (REMICADE) 100 mg, 440 mg IV infusion every 8 weeks, Disp: 4 4 each, Rfl: 3    losartan (COZAAR) 100 MG tablet, Take 1 tablet (100 mg total) by mouth daily, Disp: 90 tablet, Rfl: 1    metoprolol succinate (TOPROL-XL) 50 mg 24 hr tablet, Take 1 tablet (50 mg total) by mouth daily, Disp: 90 tablet, Rfl: 1         Objective:         Vitals:    08/11/22 0925   BP: 110/78   BP Location: Left arm   Patient Position: Sitting   Cuff Size: Standard   Pulse: 60   Temp: 98 7 °F (37 1 °C)   TempSrc: Temporal   SpO2: 96%   Weight: 96 3 kg (212 lb 3 2 oz)   Height: 5' 8 9" (1 75 m)     Physical Exam  Vitals reviewed  Constitutional:       Appearance: He is well-developed  HENT:      Head: Normocephalic and atraumatic  Nose: Nose normal       Mouth/Throat:      Pharynx: No oropharyngeal exudate  Eyes:      General: No scleral icterus  Right eye: No discharge  Left eye: No discharge  Pupils: Pupils are equal, round, and reactive to light  Neck:      Trachea: No tracheal deviation  Cardiovascular:      Rate and Rhythm: Normal rate and regular rhythm  Pulses:           Dorsalis pedis pulses are 2+ on the right side and 2+ on the left side  Posterior tibial pulses are 2+ on the right side and 2+ on the left side  Heart sounds: Normal heart sounds  No murmur heard  No friction rub  No gallop  Pulmonary:      Effort: Pulmonary effort is normal  No respiratory distress  Breath sounds: Normal breath sounds  No wheezing or rales  Abdominal:      General: Bowel sounds are normal  There is no distension  Palpations: Abdomen is soft  Tenderness: There is no abdominal tenderness  There is no guarding or rebound  Musculoskeletal:         General: Normal range of motion  Cervical back: Normal range of motion and neck supple  Lymphadenopathy:      Head:      Right side of head: No submental or submandibular adenopathy  Left side of head: No submental or submandibular adenopathy  Cervical: No cervical adenopathy  Right cervical: No superficial, deep or posterior cervical adenopathy  Left cervical: No superficial, deep or posterior cervical adenopathy  Skin:     General: Skin is warm and dry  Findings: No erythema     Neurological:      Mental Status: He is alert and oriented to person, place, and time  Cranial Nerves: No cranial nerve deficit  Sensory: No sensory deficit  Psychiatric:         Mood and Affect: Mood is not anxious or depressed  Speech: Speech normal          Behavior: Behavior normal          Thought Content:  Thought content normal          Judgment: Judgment normal

## 2022-09-09 ENCOUNTER — OFFICE VISIT (OUTPATIENT)
Dept: GASTROENTEROLOGY | Facility: AMBULARY SURGERY CENTER | Age: 52
End: 2022-09-09
Payer: COMMERCIAL

## 2022-09-09 ENCOUNTER — APPOINTMENT (OUTPATIENT)
Dept: LAB | Facility: AMBULARY SURGERY CENTER | Age: 52
End: 2022-09-09
Payer: COMMERCIAL

## 2022-09-09 VITALS
DIASTOLIC BLOOD PRESSURE: 76 MMHG | HEIGHT: 69 IN | SYSTOLIC BLOOD PRESSURE: 115 MMHG | RESPIRATION RATE: 18 BRPM | OXYGEN SATURATION: 100 % | HEART RATE: 68 BPM | WEIGHT: 212 LBS | BODY MASS INDEX: 31.4 KG/M2

## 2022-09-09 DIAGNOSIS — K50.819 CROHN'S DISEASE OF SMALL AND LARGE INTESTINES WITH COMPLICATION (HCC): ICD-10-CM

## 2022-09-09 DIAGNOSIS — R79.89 ABNORMAL LFTS: ICD-10-CM

## 2022-09-09 DIAGNOSIS — K21.9 GASTROESOPHAGEAL REFLUX DISEASE WITHOUT ESOPHAGITIS: ICD-10-CM

## 2022-09-09 DIAGNOSIS — R79.89 ABNORMAL LFTS: Primary | ICD-10-CM

## 2022-09-09 DIAGNOSIS — K50.80 CROHN'S DISEASE OF BOTH SMALL AND LARGE INTESTINE WITHOUT COMPLICATION (HCC): ICD-10-CM

## 2022-09-09 LAB
ALBUMIN SERPL BCP-MCNC: 3.8 G/DL (ref 3.5–5)
ALBUMIN SERPL BCP-MCNC: 3.8 G/DL (ref 3.5–5)
ALP SERPL-CCNC: 84 U/L (ref 46–116)
ALP SERPL-CCNC: 86 U/L (ref 46–116)
ALT SERPL W P-5'-P-CCNC: 69 U/L (ref 12–78)
ALT SERPL W P-5'-P-CCNC: 72 U/L (ref 12–78)
ANION GAP SERPL CALCULATED.3IONS-SCNC: 3 MMOL/L (ref 4–13)
AST SERPL W P-5'-P-CCNC: 35 U/L (ref 5–45)
AST SERPL W P-5'-P-CCNC: 40 U/L (ref 5–45)
BILIRUB DIRECT SERPL-MCNC: 0.18 MG/DL (ref 0–0.2)
BILIRUB SERPL-MCNC: 0.85 MG/DL (ref 0.2–1)
BILIRUB SERPL-MCNC: 0.91 MG/DL (ref 0.2–1)
BUN SERPL-MCNC: 14 MG/DL (ref 5–25)
CALCIUM SERPL-MCNC: 8.7 MG/DL (ref 8.3–10.1)
CHLORIDE SERPL-SCNC: 108 MMOL/L (ref 96–108)
CHOLEST SERPL-MCNC: 197 MG/DL
CO2 SERPL-SCNC: 27 MMOL/L (ref 21–32)
CREAT SERPL-MCNC: 0.95 MG/DL (ref 0.6–1.3)
CRP SERPL QL: <3 MG/L
ERYTHROCYTE [DISTWIDTH] IN BLOOD BY AUTOMATED COUNT: 14.6 % (ref 11.6–15.1)
EST. AVERAGE GLUCOSE BLD GHB EST-MCNC: 94 MG/DL
FERRITIN SERPL-MCNC: 51 NG/ML (ref 8–388)
GFR SERPL CREATININE-BSD FRML MDRD: 91 ML/MIN/1.73SQ M
GLUCOSE P FAST SERPL-MCNC: 93 MG/DL (ref 65–99)
HBA1C MFR BLD: 4.9 %
HBV CORE AB SER QL: NORMAL
HBV CORE IGM SER QL: NORMAL
HBV SURFACE AB SER-ACNC: <3.1 MIU/ML
HBV SURFACE AG SER QL: NORMAL
HCT VFR BLD AUTO: 36.4 % (ref 36.5–49.3)
HCV AB SER QL: NORMAL
HDLC SERPL-MCNC: 42 MG/DL
HGB BLD-MCNC: 16.1 G/DL (ref 12–17)
IGA SERPL-MCNC: 300 MG/DL (ref 70–400)
IGG SERPL-MCNC: 1050 MG/DL (ref 700–1600)
IGM SERPL-MCNC: 248 MG/DL (ref 40–230)
INR PPP: 0.91 (ref 0.84–1.19)
IRON SATN MFR SERPL: 33 % (ref 20–50)
IRON SERPL-MCNC: 126 UG/DL (ref 65–175)
LDLC SERPL CALC-MCNC: 90 MG/DL (ref 0–100)
MCH RBC QN AUTO: 32.7 PG (ref 26.8–34.3)
MCHC RBC AUTO-ENTMCNC: 34.5 G/DL (ref 31.4–37.4)
MCV RBC AUTO: 100 FL (ref 82–98)
PLATELET # BLD AUTO: 162 THOUSANDS/UL (ref 149–390)
PMV BLD AUTO: 11.2 FL (ref 8.9–12.7)
POTASSIUM SERPL-SCNC: 4.1 MMOL/L (ref 3.5–5.3)
PROT SERPL-MCNC: 7.5 G/DL (ref 6.4–8.4)
PROT SERPL-MCNC: 7.6 G/DL (ref 6.4–8.4)
PROTHROMBIN TIME: 12.5 SECONDS (ref 11.6–14.5)
RBC # BLD AUTO: 3.63 MILLION/UL (ref 3.88–5.62)
SODIUM SERPL-SCNC: 138 MMOL/L (ref 135–147)
TIBC SERPL-MCNC: 380 UG/DL (ref 250–450)
TRIGL SERPL-MCNC: 323 MG/DL
TSH SERPL DL<=0.05 MIU/L-ACNC: 2.65 UIU/ML (ref 0.45–4.5)
WBC # BLD AUTO: 5.72 THOUSAND/UL (ref 4.31–10.16)

## 2022-09-09 PROCEDURE — 84443 ASSAY THYROID STIM HORMONE: CPT

## 2022-09-09 PROCEDURE — 83540 ASSAY OF IRON: CPT

## 2022-09-09 PROCEDURE — 86705 HEP B CORE ANTIBODY IGM: CPT

## 2022-09-09 PROCEDURE — 80053 COMPREHEN METABOLIC PANEL: CPT

## 2022-09-09 PROCEDURE — 83036 HEMOGLOBIN GLYCOSYLATED A1C: CPT

## 2022-09-09 PROCEDURE — 36415 COLL VENOUS BLD VENIPUNCTURE: CPT

## 2022-09-09 PROCEDURE — 86706 HEP B SURFACE ANTIBODY: CPT

## 2022-09-09 PROCEDURE — 82103 ALPHA-1-ANTITRYPSIN TOTAL: CPT

## 2022-09-09 PROCEDURE — 83550 IRON BINDING TEST: CPT

## 2022-09-09 PROCEDURE — 80076 HEPATIC FUNCTION PANEL: CPT

## 2022-09-09 PROCEDURE — 99213 OFFICE O/P EST LOW 20 MIN: CPT | Performed by: INTERNAL MEDICINE

## 2022-09-09 PROCEDURE — 80230 DRUG ASSAY INFLIXIMAB: CPT

## 2022-09-09 PROCEDURE — 82390 ASSAY OF CERULOPLASMIN: CPT

## 2022-09-09 PROCEDURE — 86803 HEPATITIS C AB TEST: CPT

## 2022-09-09 PROCEDURE — 82728 ASSAY OF FERRITIN: CPT

## 2022-09-09 PROCEDURE — 85610 PROTHROMBIN TIME: CPT

## 2022-09-09 PROCEDURE — 80061 LIPID PANEL: CPT

## 2022-09-09 PROCEDURE — 85027 COMPLETE CBC AUTOMATED: CPT

## 2022-09-09 PROCEDURE — 82784 ASSAY IGA/IGD/IGG/IGM EACH: CPT

## 2022-09-09 PROCEDURE — 82397 CHEMILUMINESCENT ASSAY: CPT

## 2022-09-09 PROCEDURE — 87340 HEPATITIS B SURFACE AG IA: CPT

## 2022-09-09 PROCEDURE — 86381 MITOCHONDRIAL ANTIBODY EACH: CPT

## 2022-09-09 PROCEDURE — 86038 ANTINUCLEAR ANTIBODIES: CPT

## 2022-09-09 PROCEDURE — 86015 ACTIN ANTIBODY EACH: CPT

## 2022-09-09 PROCEDURE — 86704 HEP B CORE ANTIBODY TOTAL: CPT

## 2022-09-09 PROCEDURE — 86140 C-REACTIVE PROTEIN: CPT

## 2022-09-09 PROCEDURE — 86376 MICROSOMAL ANTIBODY EACH: CPT

## 2022-09-09 NOTE — PROGRESS NOTES
[facility]  Darien Brooke  45 y o  male MRN: [id number]            Assessment & Plan:    24-year-old gentle, history of ileocolonic Crohn's disease status post resection and primary anastomosis on infliximab every 8 weeks     1  Crohn's disease:  Small large intestine without complication, has a history of surgical resection many years ago  -continue infliximab every 8 weeks   -we will check therapeutic drug and antibody levels   -we will check a fecal calprotectin  -discussed with patient that he has continued inflammatory changes or low drug levels may need to modify his drugs   -patient has a very high co-pay for his medication     2  Elevated LFTs:  Recent transaminases were elevated, has had this in the past   -also noted to have some splenomegaly  -we will check extensive laboratory panel  -recommend abdominal ultrasound as had been ordered and discussed with patient in the past, has difficulty scheduling tests due to his work schedule  -will this does need to be followed closely        follow up in 6 months time      Juan Mckinley was seen today for follow-up and crohn's disease  Diagnoses and all orders for this visit:    Abnormal LFTs  -     Alpha-1-antitrypsin; Future  -     ZACH Screen w/ Reflex to Titer/Pattern; Future  -     Antimitochondrial antibody; Future  -     Anti-smooth muscle antibody, IgG; Future  -     Ceruloplasmin; Future  -     IgG, IgA, IgM; Future  -     Iron Saturation %; Future  -     Liver/Kidney Microsomal Antibody; Future  -     Protime-INR; Future  -     Hepatitis B surface antibody; Future  -     Hepatitis B surface antigen; Future  -     Chronic Hepatitis Panel; Future  -     Ferritin; Future  -     Hepatitis C antibody; Future  -     US abdomen complete; Future    Crohn's disease of both small and large intestine without complication ([facility] )  -     Calprotectin,Fecal; Future  -     C-reactive protein;  Future  -     Infliximab Concentration and Anti-Infliximab Antibody([facility]); Future  -     Hepatic function panel; Future    Gastroesophageal reflux disease without esophagitis            _____________________________________________________________        CC: Follow-up    HPI:  Karan Minor  is a 45 y o male who is here for follow-up  This is a pleasant 79-year-old gentleman, history of Crohn's disease, status post ileocolonic resection and anastomosis, last colonoscopy about a year and half ago demonstrated some ulceration at the anastomosis, this is in the setting of NSAID use  Patient reports that he is doing relatively well, has about 3 soft bowel movements daily, reports some arthralgias prior to his upcoming infusions  Otherwise does well, denies any ocular symptoms, oral lesions  Has followed up with Dermatology regular, is up-to-date on vaccinations  Denies any abdominal pain  Appetite is good  Weight has been stable  Still travels for quite a bit  Tries to minimize NSAIDs, use acetaminophen as needed  Does drink occasional, about 4 drinks per week  ROS:  The remainder of the ROS was negative except for the pertinent positives mentioned in HPI  Allergies: Patient has no known allergies      Medications:   Current Outpatient Medications:     esomeprazole (NexIUM) 20 mg capsule, Take 1 capsule (20 mg total) by mouth daily in the early morning, Disp: 90 capsule, Rfl: 2    famotidine (PEPCID) 20 mg tablet, Take 1 tablet (20 mg total) by mouth daily at bedtime as needed for heartburn, Disp: 90 tablet, Rfl: 1    inFLIXimab (REMICADE) 100 mg, 440 mg IV infusion every 8 weeks, Disp: 4 4 each, Rfl: 3    losartan (COZAAR) 100 MG tablet, Take 1 tablet (100 mg total) by mouth daily, Disp: 90 tablet, Rfl: 1    metoprolol succinate (TOPROL-XL) 50 mg 24 hr tablet, Take 1 tablet (50 mg total) by mouth daily, Disp: 90 tablet, Rfl: 1    Past Medical History:   Diagnosis Date    Allergic     Anemia     Colon polyp     Cro[facility]'s disease ([facility] )  GERD (gastroesophageal reflux disease)     Hypertension     Inflammatory bowel disease        Past Surgical History:   Procedure Laterality Date    APPENDECTOMY      COLON SURGERY      x 2 bowel resections    COLONOSCOPY      HAND SURGERY      MT COLONOSCOPY FLX DX W/COLLJ SPEC WHEN PFRMD N/A 2/12/2018    Procedure: EGD AND COLONOSCOPY;  Surgeon: Jonny Cornejo MD;  Location: [place] [address] GI LAB; Service: Gastroenterology       Family History   Problem Relation Age of Onset    Diabetes Mother     Cancer Father         colon    Hypertension Father     Crohn's disease Paternal Uncle         reports that he has quit smoking  His smoking use included cigarettes  He smoked 0 00 packs per day for 10 00 years  He has never used smokeless tobacco  He reports current alcohol use  He reports that he does not use drugs        Physical Exam:    /76 (BP Location: Right arm, Patient Position: Sitting, Cuff Size: Standard)   Pulse 68   Resp 18   Ht 5' 9" (1 753 m)   Wt 96 2 kg (212 lb)   SpO2 100%   BMI 31 31 kg/m²     Gen: wn/wd, NAD, healthy-appearing gentleman  HEENT: anicteric, MMM, no cervical LAD  CVS: RRR, no m/r/g  CHEST: CTA b/l  ABD: +BS, soft, NT,ND, no hepatosplenomegaly  EXT: no c/c/e  NEURO: aaox3  SKIN: NO rashes

## 2022-09-09 NOTE — LETTER
September 9, 2022     Charli Hauser DO  [address]  [address] [place] [address]    Patient: Lj Eli  YOB: 1970   Date of Visit: 9/9/2022       Dear Dr Josh Doran:    Thank you for referring Brenda Nelson to me for evaluation  Below are my notes for this consultation  If you have questions, please do not hesitate to call me  I look forward to following your patient along with you  Sincerely,        Panchito Treadwell MD        CC: No Recipients  Panchito Treadwell MD  9/9/2022  8:25 AM  Sign when Signing Visit  [address] [facility]  Lj Eli  45 y o  male MRN: [id number]            Assessment & Plan:    67-year-old gentle, history of ileocolonic Crohn's disease status post resection and primary anastomosis on infliximab every 8 weeks     1  Crohn's disease:  Small large intestine without complication, has a history of surgical resection many years ago  -continue infliximab every 8 weeks   -we will check therapeutic drug and antibody levels   -we will check a fecal calprotectin  -discussed with patient that he has continued inflammatory changes or low drug levels may need to modify his drugs   -patient has a very high co-pay for his medication     2  Elevated LFTs:  Recent transaminases were elevated, has had this in the past   -also noted to have some splenomegaly  -we will check extensive laboratory panel  -recommend abdominal ultrasound as had been ordered and discussed with patient in the past, has difficulty scheduling tests due to his work schedule  -will this does need to be followed closely        follow up in 6 months time      Meenu Acevedo was seen today for follow-up and crohn's disease  Diagnoses and all orders for this visit:    Abnormal LFTs  -     Alpha-1-antitrypsin; Future  -     ZACH Screen w/ Reflex to Titer/Pattern; Future  -     Antimitochondrial antibody; Future  -     Anti-smooth muscle antibody, IgG; Future  -     Ceruloplasmin;  Future  -     IgG, IgA, IgM; Future  -     Iron Saturation %; Future  -     Liver/Kidney Microsomal Antibody; Future  -     Protime-INR; Future  -     Hepatitis B surface antibody; Future  -     Hepatitis B surface antigen; Future  -     Chronic Hepatitis Panel; Future  -     Ferritin; Future  -     Hepatitis C antibody; Future  -     US abdomen complete; Future    Crohn's disease of both small and large intestine without complication ([facility] )  -     Calprotectin,Fecal; Future  -     C-reactive protein; Future  -     Infliximab Concentration and Anti-Infliximab Antibody([facility]); Future  -     Hepatic function panel; Future    Gastroesophageal reflux disease without esophagitis            _____________________________________________________________        CC: Follow-up    HPI:  Janelle Oliva  is a 45 y o male who is here for follow-up  This is a pleasant 48-year-old gentleman, history of Crohn's disease, status post ileocolonic resection and anastomosis, last colonoscopy about a year and half ago demonstrated some ulceration at the anastomosis, this is in the setting of NSAID use  Patient reports that he is doing relatively well, has about 3 soft bowel movements daily, reports some arthralgias prior to his upcoming infusions  Otherwise does well, denies any ocular symptoms, oral lesions  Has followed up with Dermatology regular, is up-to-date on vaccinations  Denies any abdominal pain  Appetite is good  Weight has been stable  Still travels for quite a bit  Tries to minimize NSAIDs, use acetaminophen as needed  Does drink occasional, about 4 drinks per week  ROS:  The remainder of the ROS was negative except for the pertinent positives mentioned in HPI  Allergies: Patient has no known allergies      Medications:   Current Outpatient Medications:     esomeprazole (NexIUM) 20 mg capsule, Take 1 capsule (20 mg total) by mouth daily in the early morning, Disp: 90 capsule, Rfl: 2    famotidine (PEPCID) 20 mg tablet, Take 1 tablet (20 mg total) by mouth daily at bedtime as needed for heartburn, Disp: 90 tablet, Rfl: 1    inFLIXimab (REMICADE) 100 mg, 440 mg IV infusion every 8 weeks, Disp: 4 4 each, Rfl: 3    losartan (COZAAR) 100 MG tablet, Take 1 tablet (100 mg total) by mouth daily, Disp: 90 tablet, Rfl: 1    metoprolol succinate (TOPROL-XL) 50 mg 24 hr tablet, Take 1 tablet (50 mg total) by mouth daily, Disp: 90 tablet, Rfl: 1    Past Medical History:   Diagnosis Date    Allergic     Anemia     Colon polyp     Crohn's disease ([address] )     GERD (gastroesophageal reflux disease)     Hypertension     Inflammatory bowel disease        Past Surgical History:   Procedure Laterality Date    APPENDECTOMY      COLON SURGERY      x 2 bowel resections    COLONOSCOPY      HAND SURGERY      SD COLONOSCOPY FLX DX W/COLLJ SPEC WHEN PFRMD N/A 2/12/2018    Procedure: EGD AND COLONOSCOPY;  Surgeon: Narda Cooper MD;  Location: [facility]; Service: Gastroenterology       Family History   Problem Relation Age of Onset    Diabetes Mother     Cancer Father         colon    Hypertension Father     Crohn's disease Paternal Uncle         reports that he has quit smoking  His smoking use included cigarettes  He smoked 0 00 packs per day for 10 00 years  He has never used smokeless tobacco  He reports current alcohol use  He reports that he does not use drugs        Physical Exam:    /76 (BP Location: Right arm, Patient Position: Sitting, Cuff Size: Standard)   Pulse 68   Resp 18   Ht 5' 9" (1 753 m)   Wt 96 2 kg (212 lb)   SpO2 100%   BMI 31 31 kg/m²     Gen: wn/wd, NAD, healthy-appearing gentleman  HEENT: anicteric, MMM, no cervical LAD  CVS: RRR, no m/r/g  CHEST: CTA b/l  ABD: +BS, soft, NT,ND, no hepatosplenomegaly  EXT: no c/c/e  NEURO: aaox3  SKIN: NO rashes

## 2022-09-10 LAB
A1AT SERPL-MCNC: 121 MG/DL (ref 101–187)
ACTIN IGG SERPL-ACNC: 5 UNITS (ref 0–19)
CERULOPLASMIN SERPL-MCNC: 15.8 MG/DL (ref 16–31)
LKM-1 AB SER-ACNC: <20.1 UNITS (ref 0–20)
MITOCHONDRIA M2 IGG SER-ACNC: <20 UNITS (ref 0–20)

## 2022-09-12 LAB — RYE IGE QN: NEGATIVE

## 2022-09-13 DIAGNOSIS — K21.9 GASTROESOPHAGEAL REFLUX DISEASE, UNSPECIFIED WHETHER ESOPHAGITIS PRESENT: ICD-10-CM

## 2022-09-13 DIAGNOSIS — I10 ESSENTIAL HYPERTENSION: ICD-10-CM

## 2022-09-13 RX ORDER — METOPROLOL SUCCINATE 50 MG/1
TABLET, EXTENDED RELEASE ORAL
Qty: 90 TABLET | Refills: 1 | Status: SHIPPED | OUTPATIENT
Start: 2022-09-13

## 2022-09-13 RX ORDER — FAMOTIDINE 20 MG/1
20 TABLET, FILM COATED ORAL
Qty: 90 TABLET | Refills: 1 | Status: SHIPPED | OUTPATIENT
Start: 2022-09-13

## 2022-09-13 RX ORDER — LOSARTAN POTASSIUM 100 MG/1
TABLET ORAL
Qty: 90 TABLET | Refills: 1 | Status: SHIPPED | OUTPATIENT
Start: 2022-09-13

## 2022-09-16 ENCOUNTER — APPOINTMENT (OUTPATIENT)
Dept: LAB | Facility: MEDICAL CENTER | Age: 52
End: 2022-09-16
Payer: COMMERCIAL

## 2022-09-16 DIAGNOSIS — K50.80 CROHN'S DISEASE OF BOTH SMALL AND LARGE INTESTINE WITHOUT COMPLICATION (HCC): ICD-10-CM

## 2022-09-16 PROCEDURE — 83993 ASSAY FOR CALPROTECTIN FECAL: CPT

## 2022-09-20 LAB
INFLIXIMAB AB SERPL-MCNC: 40 NG/ML
INFLIXIMAB SERPL-MCNC: 9.9 UG/ML

## 2022-09-22 LAB — CALPROTECTIN STL-MCNT: 41 UG/G (ref 0–120)

## 2022-09-26 ENCOUNTER — TELEPHONE (OUTPATIENT)
Dept: GASTROENTEROLOGY | Facility: AMBULARY SURGERY CENTER | Age: 52
End: 2022-09-26

## 2022-09-27 ENCOUNTER — HOSPITAL ENCOUNTER (OUTPATIENT)
Dept: ULTRASOUND IMAGING | Facility: MEDICAL CENTER | Age: 52
Discharge: HOME | End: 2022-09-27
Payer: COMMERCIAL

## 2022-09-27 DIAGNOSIS — R79.89 ABNORMAL LFTS: ICD-10-CM

## 2022-09-27 PROCEDURE — 76700 US EXAM ABDOM COMPLETE: CPT

## 2022-12-09 ENCOUNTER — TELEPHONE (OUTPATIENT)
Dept: OTHER | Facility: OTHER | Age: 52
End: 2022-12-09

## 2022-12-09 NOTE — TELEPHONE ENCOUNTER
Good afternoon ! Patient has called requesting for Dr Veronica Moraes to please have his infusion schedule for the week of January 9th   His infusion that is administered at home is from     [facility] :   Number:  [phone]    Director of nursing : [email or web address]    Would like Infusion for Friday January 13 th , [place]  Please call patient back with any questions or concerns

## 2022-12-12 NOTE — TELEPHONE ENCOUNTER
Called [facility] at [phone] and spoke with Geovany Borges she said the patient can just call in and reschedule  Called patient and he stated he did reschedule it already they just need the okay from us since it would be about a week early

## 2022-12-12 NOTE — TELEPHONE ENCOUNTER
Hi Dr Price,    Please see below  Patient will be out of the country in January, so is asking to receive his infusion the week prior while he is still home  Are you okay with this? If so, I can email the director of nursing to let them know  Thank you!

## 2022-12-13 NOTE — TELEPHONE ENCOUNTER
Called [facility] and reached voicemail   I left a message stating it was okay to move appt for patient and provided my call back number for any questions or concerns

## 2022-12-30 DIAGNOSIS — K50.919 CROHN'S DISEASE WITH COMPLICATION, UNSPECIFIED GASTROINTESTINAL TRACT LOCATION (HCC): ICD-10-CM

## 2022-12-30 RX ORDER — INFLIXIMAB 100 MG/10ML
INJECTION, POWDER, LYOPHILIZED, FOR SOLUTION INTRAVENOUS
Qty: 4.4 EACH | Refills: 3 | Status: SHIPPED | OUTPATIENT
Start: 2022-12-30 | End: 2023-08-17 | Stop reason: SDUPTHER

## 2022-12-30 RX ORDER — INFLIXIMAB 100 MG/10ML
INJECTION, POWDER, LYOPHILIZED, FOR SOLUTION INTRAVENOUS
Qty: 4.4 EACH | Refills: 3 | Status: SHIPPED | OUTPATIENT
Start: 2022-12-30 | End: 2022-12-30 | Stop reason: SDUPTHER

## 2022-12-30 NOTE — TELEPHONE ENCOUNTER
GI Physician: Dr Mcdaniels  for Medication: Remicade    Dose: 100 mg    Quantity:     Pharmacy and Location: [facility]    Tashia from [facility] requested a refill for the pt's remicade be sent to them

## 2022-12-30 NOTE — TELEPHONE ENCOUNTER
PT called back stating Remicade was sent to [facility] instead of [facility]   Pt would like infusion to go to [facility] [address], [facility] [address] [phone]

## 2023-02-13 ENCOUNTER — OFFICE VISIT (OUTPATIENT)
Dept: FAMILY MEDICINE CLINIC | Facility: CLINIC | Age: 53
End: 2023-02-13

## 2023-02-13 VITALS
OXYGEN SATURATION: 97 % | TEMPERATURE: 97.8 F | DIASTOLIC BLOOD PRESSURE: 92 MMHG | WEIGHT: 216 LBS | HEIGHT: 69 IN | HEART RATE: 59 BPM | BODY MASS INDEX: 31.99 KG/M2 | SYSTOLIC BLOOD PRESSURE: 120 MMHG

## 2023-02-13 DIAGNOSIS — Z23 NEED FOR SHINGLES VACCINE: Primary | ICD-10-CM

## 2023-02-13 DIAGNOSIS — K21.9 GASTROESOPHAGEAL REFLUX DISEASE WITHOUT ESOPHAGITIS: ICD-10-CM

## 2023-02-13 DIAGNOSIS — I10 ESSENTIAL HYPERTENSION: ICD-10-CM

## 2023-02-13 DIAGNOSIS — E78.00 HYPERCHOLESTEREMIA: ICD-10-CM

## 2023-02-13 DIAGNOSIS — K50.80 CROHN'S DISEASE OF BOTH SMALL AND LARGE INTESTINE WITHOUT COMPLICATION (HCC): ICD-10-CM

## 2023-02-13 DIAGNOSIS — Z13.6 ENCOUNTER FOR SCREENING FOR CORONARY ARTERY DISEASE: ICD-10-CM

## 2023-02-13 RX ORDER — LOSARTAN POTASSIUM 100 MG/1
100 TABLET ORAL DAILY
Qty: 90 TABLET | Refills: 1 | Status: SHIPPED | OUTPATIENT
Start: 2023-02-13

## 2023-02-13 RX ORDER — METOPROLOL SUCCINATE 50 MG/1
50 TABLET, EXTENDED RELEASE ORAL DAILY
Qty: 90 TABLET | Refills: 1 | Status: SHIPPED | OUTPATIENT
Start: 2023-02-13

## 2023-02-13 NOTE — PROGRESS NOTES
Assessment/Plan:   1  Essential hypertension  Patient's blood pressure appears overall stable  Continue with routine home monitoring as well as current treatment with losartan as well as metoprolol  He was encouraged to continue with a strict diet and exercise  - losartan (COZAAR) 100 MG tablet; Take 1 tablet (100 mg total) by mouth daily  Dispense: 90 tablet; Refill: 1  - metoprolol succinate (TOPROL-XL) 50 mg 24 hr tablet; Take 1 tablet (50 mg total) by mouth daily  Dispense: 90 tablet; Refill: 1    2  Crohn's disease of both small and large intestine without complication ([facility] )  Has any recent exacerbations  He does follow with gastroenterology regularly  Continue with his current treatment of Remicade  3  Hypercholesteremia  Reviewed previous lipid panel  Recheck CMP and lipid panel today  Continue with a strict low-fat/low-cholesterol diet  Given his previous cholesterol elevation he was advised that it would be beneficial to complete a CT coronary calcium score to further assess his cardiovascular risk  This order was placed for him  If largely abnormal, will consider further evaluation with cardiology  - Comprehensive metabolic panel; Future  - Lipid Panel with Direct LDL reflex; Future    4  Gastroesophageal reflux disease without esophagitis  Stable  Continue with dietary trigger avoidance as well as current treatment with as omeprazole as well as famotidine  Follow-up in 6 months  5  Need for shingles vaccine  - Zoster Vaccine Recombinant IM    6  Encounter for screening for coronary artery disease  - CT coronary calcium score; Future           Diagnoses and all orders for this visit:    Need for shingles vaccine  -     Zoster Vaccine Recombinant IM    Essential hypertension  -     losartan (COZAAR) 100 MG tablet; Take 1 tablet (100 mg total) by mouth daily  -     metoprolol succinate (TOPROL-XL) 50 mg 24 hr tablet;  Take 1 tablet (50 mg total) by mouth daily    Crohn's disease of both small and large intestine without complication (HCC)    Hypercholesteremia  -     Comprehensive metabolic panel; Future  -     Lipid Panel with Direct LDL reflex; Future    Gastroesophageal reflux disease without esophagitis    Encounter for screening for coronary artery disease  -     CT coronary calcium score; Future          Subjective:       Chief Complaint   Patient presents with   • Follow-up     Would also like referral to Cardiologist (preventative)      Patient ID: Andrey Dubin  is a 47 y o  male is today for a follow-up on his chronic conditions  He has hypertension, Crohn's disease, dyslipidemia, GERD  He has been taking his medications regularly  He denies adverse reactions with his medications  He states that he would like to see if he needs to see a cardiologist and potentially have a coronary calcium score completed  HPI    Review of Systems   Constitutional: Negative for activity change, chills, fatigue and fever  HENT: Negative for congestion, ear pain, sinus pressure and sore throat  Eyes: Negative for redness, itching and visual disturbance  Respiratory: Negative for cough and shortness of breath  Cardiovascular: Negative for chest pain and palpitations  Gastrointestinal: Negative for abdominal pain, diarrhea and nausea  Endocrine: Negative for cold intolerance and heat intolerance  Genitourinary: Negative for dysuria, flank pain and frequency  Musculoskeletal: Negative for arthralgias, back pain, gait problem and myalgias  Skin: Negative for color change  Allergic/Immunologic: Negative for environmental allergies  Neurological: Negative for dizziness, numbness and headaches  Psychiatric/Behavioral: Negative for behavioral problems and sleep disturbance  The following portions of the patient's history were reviewed and updated as appropriate : past family history, past medical history, past social history and past surgical history      Current Outpatient Medications:   •  esomeprazole (NexIUM) 20 mg capsule, Take 1 capsule (20 mg total) by mouth daily in the early morning, Disp: 90 capsule, Rfl: 2  •  famotidine (PEPCID) 20 mg tablet, TAKE 1 TABLET (20 MG TOTAL) BY MOUTH DAILY AT BEDTIME AS NEEDED FOR HEARTBURN, Disp: 90 tablet, Rfl: 1  •  inFLIXimab (REMICADE) 100 mg, 440 mg IV infusion every 8 weeks, Disp: 4 4 each, Rfl: 3  •  losartan (COZAAR) 100 MG tablet, Take 1 tablet (100 mg total) by mouth daily, Disp: 90 tablet, Rfl: 1  •  metoprolol succinate (TOPROL-XL) 50 mg 24 hr tablet, Take 1 tablet (50 mg total) by mouth daily, Disp: 90 tablet, Rfl: 1         Objective:         Vitals:    02/13/23 0848   BP: 120/92   BP Location: Right arm   Patient Position: Sitting   Cuff Size: Adult   Pulse: 59   Temp: 97 8 °F (36 6 °C)   SpO2: 97%   Weight: 98 kg (216 lb)   Height: 5' 9" (1 753 m)     Physical Exam  Vitals reviewed  Constitutional:       Appearance: He is well-developed  HENT:      Head: Normocephalic and atraumatic  Nose: Nose normal       Mouth/Throat:      Pharynx: No oropharyngeal exudate  Eyes:      General: No scleral icterus  Right eye: No discharge  Left eye: No discharge  Pupils: Pupils are equal, round, and reactive to light  Neck:      Trachea: No tracheal deviation  Cardiovascular:      Rate and Rhythm: Normal rate and regular rhythm  Pulses:           Dorsalis pedis pulses are 2+ on the right side and 2+ on the left side  Posterior tibial pulses are 2+ on the right side and 2+ on the left side  Heart sounds: Normal heart sounds  No murmur heard  No friction rub  No gallop  Pulmonary:      Effort: Pulmonary effort is normal  No respiratory distress  Breath sounds: Normal breath sounds  No wheezing or rales  Abdominal:      General: Bowel sounds are normal  There is no distension  Palpations: Abdomen is soft  Tenderness: There is no abdominal tenderness   There is no guarding or rebound  Musculoskeletal:         General: Normal range of motion  Cervical back: Normal range of motion and neck supple  Lymphadenopathy:      Head:      Right side of head: No submental or submandibular adenopathy  Left side of head: No submental or submandibular adenopathy  Cervical: No cervical adenopathy  Right cervical: No superficial, deep or posterior cervical adenopathy  Left cervical: No superficial, deep or posterior cervical adenopathy  Skin:     General: Skin is warm and dry  Findings: No erythema  Neurological:      Mental Status: He is alert and oriented to person, place, and time  Cranial Nerves: No cranial nerve deficit  Sensory: No sensory deficit  Psychiatric:         Mood and Affect: Mood is not anxious or depressed  Speech: Speech normal          Behavior: Behavior normal          Thought Content:  Thought content normal          Judgment: Judgment normal

## 2023-03-01 ENCOUNTER — TELEPHONE (OUTPATIENT)
Dept: OTHER | Facility: OTHER | Age: 53
End: 2023-03-01

## 2023-03-01 NOTE — TELEPHONE ENCOUNTER
Received call from Howard Greenwood of [facility] requesting date of patient's last OV of 9/9/22 and is requesting office notes to be faxed over to submit for patient's renewal for infusions for Remicade   Please fax to # [phone]

## 2023-03-04 ENCOUNTER — HOSPITAL ENCOUNTER (OUTPATIENT)
Dept: CT IMAGING | Facility: HOSPITAL | Age: 53
Discharge: HOME | End: 2023-03-04

## 2023-03-04 DIAGNOSIS — Z13.6 ENCOUNTER FOR SCREENING FOR CORONARY ARTERY DISEASE: ICD-10-CM

## 2023-03-17 DIAGNOSIS — K21.9 GASTROESOPHAGEAL REFLUX DISEASE, UNSPECIFIED WHETHER ESOPHAGITIS PRESENT: ICD-10-CM

## 2023-03-17 RX ORDER — FAMOTIDINE 20 MG/1
20 TABLET, FILM COATED ORAL
Qty: 90 TABLET | Refills: 1 | Status: SHIPPED | OUTPATIENT
Start: 2023-03-17

## 2023-05-11 ENCOUNTER — OFFICE VISIT (OUTPATIENT)
Dept: FAMILY MEDICINE CLINIC | Facility: CLINIC | Age: 53
End: 2023-05-11

## 2023-05-11 VITALS
DIASTOLIC BLOOD PRESSURE: 80 MMHG | BODY MASS INDEX: 31.84 KG/M2 | WEIGHT: 215 LBS | SYSTOLIC BLOOD PRESSURE: 110 MMHG | HEART RATE: 66 BPM | OXYGEN SATURATION: 97 % | HEIGHT: 69 IN | TEMPERATURE: 97.7 F

## 2023-05-11 DIAGNOSIS — I10 PRIMARY HYPERTENSION: Primary | ICD-10-CM

## 2023-05-11 NOTE — PROGRESS NOTES
Assessment/Plan:   1  Primary hypertension  Blood pressure appears very well controlled today  At this time, he states that he has been taking his losartan as well as his metoprolol regularly  He denies any hypertensive and hypotensive symptoms today  At this time, we will continue with routine home blood pressure monitoring  We will hold off on adjusting his blood pressure medications  He will bring in a log after 2 weeks for further review  There are no diagnoses linked to this encounter  Subjective:       Chief Complaint   Patient presents with   • Hypertension     Bp check  Patient ID: Klever Parra  is a 47 y o  male presents today for concerns for elevated blood pressure  Since his last visit, he states that he has been receiving his bimonthly Remicade infusions  He states that his nurse who administers his injection has been checking his blood pressures and he has been having elevated diastolic readings  Patient has been monitoring his blood pressure at home as well  He states that his blood pressure has been stable however has been periodically having elevations  HPI    Review of Systems   Constitutional: Negative for activity change, chills, fatigue and fever  HENT: Negative for congestion, ear pain, sinus pressure and sore throat  Eyes: Negative for redness, itching and visual disturbance  Respiratory: Negative for cough and shortness of breath  Cardiovascular: Negative for chest pain and palpitations  Gastrointestinal: Negative for abdominal pain, diarrhea and nausea  Endocrine: Negative for cold intolerance and heat intolerance  Genitourinary: Negative for dysuria, flank pain and frequency  Musculoskeletal: Negative for arthralgias, back pain, gait problem and myalgias  Skin: Negative for color change  Allergic/Immunologic: Negative for environmental allergies  Neurological: Negative for dizziness, numbness and headaches     Psychiatric/Behavioral: "Negative for behavioral problems and sleep disturbance  The following portions of the patient's history were reviewed and updated as appropriate : past family history, past medical history, past social history and past surgical history  Current Outpatient Medications:   •  esomeprazole (NexIUM) 20 mg capsule, Take 1 capsule (20 mg total) by mouth daily in the early morning, Disp: 90 capsule, Rfl: 2  •  famotidine (PEPCID) 20 mg tablet, TAKE 1 TABLET (20 MG TOTAL) BY MOUTH DAILY AT BEDTIME AS NEEDED FOR HEARTBURN, Disp: 90 tablet, Rfl: 1  •  inFLIXimab (REMICADE) 100 mg, 440 mg IV infusion every 8 weeks, Disp: 4 4 each, Rfl: 3  •  losartan (COZAAR) 100 MG tablet, Take 1 tablet (100 mg total) by mouth daily, Disp: 90 tablet, Rfl: 1  •  metoprolol succinate (TOPROL-XL) 50 mg 24 hr tablet, Take 1 tablet (50 mg total) by mouth daily, Disp: 90 tablet, Rfl: 1         Objective:         Vitals:    05/11/23 0905   BP: 110/80   BP Location: Left arm   Patient Position: Sitting   Cuff Size: Adult   Pulse: 66   Temp: 97 7 °F (36 5 °C)   SpO2: 97%   Weight: 97 5 kg (215 lb)   Height: 5' 9\" (1 753 m)     Physical Exam  Vitals reviewed  Constitutional:       Appearance: He is well-developed  HENT:      Head: Normocephalic and atraumatic  Nose: Nose normal       Mouth/Throat:      Pharynx: No oropharyngeal exudate  Eyes:      General: No scleral icterus  Right eye: No discharge  Left eye: No discharge  Pupils: Pupils are equal, round, and reactive to light  Neck:      Trachea: No tracheal deviation  Cardiovascular:      Rate and Rhythm: Normal rate and regular rhythm  Pulses:           Dorsalis pedis pulses are 2+ on the right side and 2+ on the left side  Posterior tibial pulses are 2+ on the right side and 2+ on the left side  Heart sounds: Normal heart sounds  No murmur heard  No friction rub  No gallop     Pulmonary:      Effort: Pulmonary effort is normal  No " respiratory distress  Breath sounds: Normal breath sounds  No wheezing or rales  Abdominal:      General: Bowel sounds are normal  There is no distension  Palpations: Abdomen is soft  Tenderness: There is no abdominal tenderness  There is no guarding or rebound  Musculoskeletal:         General: Normal range of motion  Cervical back: Normal range of motion and neck supple  Lymphadenopathy:      Head:      Right side of head: No submental or submandibular adenopathy  Left side of head: No submental or submandibular adenopathy  Cervical: No cervical adenopathy  Right cervical: No superficial, deep or posterior cervical adenopathy  Left cervical: No superficial, deep or posterior cervical adenopathy  Skin:     General: Skin is warm and dry  Findings: No erythema  Neurological:      Mental Status: He is alert and oriented to person, place, and time  Cranial Nerves: No cranial nerve deficit  Sensory: No sensory deficit  Psychiatric:         Mood and Affect: Mood is not anxious or depressed  Speech: Speech normal          Behavior: Behavior normal          Thought Content:  Thought content normal          Judgment: Judgment normal

## 2023-08-01 ENCOUNTER — OFFICE VISIT (OUTPATIENT)
Dept: FAMILY MEDICINE CLINIC | Facility: CLINIC | Age: 53
End: 2023-08-01
Payer: COMMERCIAL

## 2023-08-01 VITALS
OXYGEN SATURATION: 96 % | HEIGHT: 69 IN | DIASTOLIC BLOOD PRESSURE: 82 MMHG | HEART RATE: 67 BPM | BODY MASS INDEX: 32.29 KG/M2 | TEMPERATURE: 98 F | SYSTOLIC BLOOD PRESSURE: 114 MMHG | WEIGHT: 218 LBS

## 2023-08-01 DIAGNOSIS — K50.80 CROHN'S DISEASE OF BOTH SMALL AND LARGE INTESTINE WITHOUT COMPLICATION (HCC): ICD-10-CM

## 2023-08-01 DIAGNOSIS — E78.00 HYPERCHOLESTEREMIA: ICD-10-CM
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
## 2023-08-01 DIAGNOSIS — I10 PRIMARY HYPERTENSION: Primary | ICD-10-CM

## 2023-08-01 DIAGNOSIS — I10 ESSENTIAL HYPERTENSION: ICD-10-CM

## 2023-08-01 DIAGNOSIS — K21.9 GASTROESOPHAGEAL REFLUX DISEASE WITHOUT ESOPHAGITIS: ICD-10-CM

## 2023-08-01 PROCEDURE — 99214 OFFICE O/P EST MOD 30 MIN: CPT | Performed by: FAMILY MEDICINE

## 2023-08-01 RX ORDER — LOSARTAN POTASSIUM AND HYDROCHLOROTHIAZIDE 12.5; 1 MG/1; MG/1
1 TABLET ORAL DAILY
Qty: 90 TABLET | Refills: 1 | Status: SHIPPED | OUTPATIENT
Start: 2023-08-01

## 2023-08-01 RX ORDER — METOPROLOL SUCCINATE 50 MG/1
50 TABLET, EXTENDED RELEASE ORAL DAILY
Qty: 90 TABLET | Refills: 1 | Status: SHIPPED | OUTPATIENT
Start: 2023-08-01

## 2023-08-01 RX ORDER — LOSARTAN POTASSIUM AND HYDROCHLOROTHIAZIDE 12.5; 1 MG/1; MG/1
1 TABLET ORAL DAILY
Qty: 30 TABLET | Refills: 5 | Status: SHIPPED | OUTPATIENT
Start: 2023-08-01 | End: 2023-08-01 | Stop reason: SDUPTHER

## 2023-08-01 NOTE — PROGRESS NOTES
Assessment/Plan:   1. Primary hypertension  Reviewed patient's blood pressure today. His blood pressure. Very well controlled. Reviewed his home readings. At this time, he is highly concerned over his blood pressure readings. He was advised that the addition of further blood pressure medications may cause hypotensive episodes. At this time, we will add hydrochlorothiazide to his losartan. Continue with his metoprolol. He was advised to bring blood pressure log in 2 weeks. - losartan-hydrochlorothiazide (HYZAAR) 100-12.5 MG per tablet; Take 1 tablet by mouth daily  Dispense: 90 tablet; Refill: 1    2. Crohn's disease of both small and large intestine without complication ([address])  Stable. Continue with his current treatment of Remicade. 3. Hypercholesteremia  Unclear risk control. Recheck lipid panel. Patient did not complete this from his last visit. Continue with a strict low fat/low cholesterol. 4. Gastroesophageal reflux disease without esophagitis  Stable. Continue with his current treatment famotidine as well as Nexium. 5. Tick bite, unspecified site, initial encounter  Patient was advised today that his Lyme antibody test may remain positive long after he has had Lyme disease. He states that he does feel similar to the way he felt when he had Lyme disease. He at this time, at his request we will check a Lyme titer. - Lyme Total AB W Reflex to IGM/IGG; Future               Diagnoses and all orders for this visit:    Primary hypertension  -     Discontinue: losartan-hydrochlorothiazide (HYZAAR) 100-12.5 MG per tablet; Take 1 tablet by mouth daily  -     losartan-hydrochlorothiazide (HYZAAR) 100-12.5 MG per tablet;  Take 1 tablet by mouth daily    Crohn's disease of both small and large intestine without complication (HCC)    Hypercholesteremia    Gastroesophageal reflux disease without esophagitis    Tick bite, unspecified site, initial encounter  -     Lyme Total AB W Reflex to IGM/IGG; Future    Essential hypertension  -     metoprolol succinate (TOPROL-XL) 50 mg 24 hr tablet; Take 1 tablet (50 mg total) by mouth daily          Subjective:       Chief Complaint   Patient presents with   • Follow-up     6 month follow up- pt complains of bp issues with the dystolic number high. Patient ID: Dolly Levine. is a 47 y.o. male presents today for a follow-up on his chronic conditions. He has hypertension, Crohn's disease, dyslipidemia, GERD. Has been taking medications regularly. He denies adverse reactions with his medications. He states that he has had multiple tick bites recently and would like to have a Lyme test done. He states that he did have Lyme disease in the past.  He has been highly concerned about his blood pressure. He states that he has been checking his blood pressures at home and has been having high diastolic readings. He states that prior to and during his Remicade infusion, his nurse checks his blood pressure and has been having large concerns secondary to his high diastolic reading. HPI    Review of Systems   Constitutional: Negative for activity change, chills, fatigue and fever. HENT: Negative for congestion, ear pain, sinus pressure and sore throat. Eyes: Negative for redness, itching and visual disturbance. Respiratory: Negative for cough and shortness of breath. Cardiovascular: Negative for chest pain and palpitations. Gastrointestinal: Negative for abdominal pain, diarrhea and nausea. Endocrine: Negative for cold intolerance and heat intolerance. Genitourinary: Negative for dysuria, flank pain and frequency. Musculoskeletal: Negative for arthralgias, back pain, gait problem and myalgias. Skin: Negative for color change. Allergic/Immunologic: Negative for environmental allergies. Neurological: Negative for dizziness, numbness and headaches. Psychiatric/Behavioral: Negative for behavioral problems and sleep disturbance.        The following portions of the patient's history were reviewed and updated as appropriate : past family history, past medical history, past social history and past surgical history. Current Outpatient Medications:   •  esomeprazole (NexIUM) 20 mg capsule, Take 1 capsule (20 mg total) by mouth daily in the early morning, Disp: 90 capsule, Rfl: 2  •  famotidine (PEPCID) 20 mg tablet, TAKE 1 TABLET (20 MG TOTAL) BY MOUTH DAILY AT BEDTIME AS NEEDED FOR HEARTBURN, Disp: 90 tablet, Rfl: 1  •  inFLIXimab (REMICADE) 100 mg, 440 mg IV infusion every 8 weeks, Disp: 4.4 each, Rfl: 3  •  losartan-hydrochlorothiazide (HYZAAR) 100-12.5 MG per tablet, Take 1 tablet by mouth daily, Disp: 90 tablet, Rfl: 1  •  metoprolol succinate (TOPROL-XL) 50 mg 24 hr tablet, Take 1 tablet (50 mg total) by mouth daily, Disp: 90 tablet, Rfl: 1         Objective:         Vitals:    08/01/23 0906 08/01/23 0924   BP: 110/78 114/82   BP Location: Left arm Right arm   Patient Position: Sitting    Cuff Size: Adult    Pulse: 67    Temp: 98 °F (36.7 °C)    SpO2: 96%    Weight: 98.9 kg (218 lb)    Height: 5' 9" (1.753 m)      Physical Exam  Vitals reviewed. Constitutional:       Appearance: He is well-developed. HENT:      Head: Normocephalic and atraumatic. Nose: Nose normal.      Mouth/Throat:      Pharynx: No oropharyngeal exudate. Eyes:      General: No scleral icterus. Right eye: No discharge. Left eye: No discharge. Pupils: Pupils are equal, round, and reactive to light. Neck:      Trachea: No tracheal deviation. Cardiovascular:      Rate and Rhythm: Normal rate and regular rhythm. Pulses:           Dorsalis pedis pulses are 2+ on the right side and 2+ on the left side. Posterior tibial pulses are 2+ on the right side and 2+ on the left side. Heart sounds: Normal heart sounds. No murmur heard. No friction rub. No gallop. Pulmonary:      Effort: Pulmonary effort is normal. No respiratory distress. Breath sounds: Normal breath sounds. No wheezing or rales. Abdominal:      General: Bowel sounds are normal. There is no distension. Palpations: Abdomen is soft. Tenderness: There is no abdominal tenderness. There is no guarding or rebound. Musculoskeletal:         General: Normal range of motion. Cervical back: Normal range of motion and neck supple. Lymphadenopathy:      Head:      Right side of head: No submental or submandibular adenopathy. Left side of head: No submental or submandibular adenopathy. Cervical: No cervical adenopathy. Right cervical: No superficial, deep or posterior cervical adenopathy. Left cervical: No superficial, deep or posterior cervical adenopathy. Skin:     General: Skin is warm and dry. Findings: No erythema. Neurological:      Mental Status: He is alert and oriented to person, place, and time. Cranial Nerves: No cranial nerve deficit. Sensory: No sensory deficit. Psychiatric:         Mood and Affect: Mood is not anxious or depressed. Speech: Speech normal.         Behavior: Behavior normal.         Thought Content:  Thought content normal.         Judgment: Judgment normal.

## 2023-08-02 ENCOUNTER — APPOINTMENT (OUTPATIENT)
Dept: LAB | Facility: CLINIC | Age: 53
End: 2023-08-02
Payer: COMMERCIAL

## 2023-08-02 DIAGNOSIS — E78.00 HYPERCHOLESTEREMIA: ICD-10-CM

## 2023-08-02 DIAGNOSIS — E78.1 HYPERTRIGLYCERIDEMIA: ICD-10-CM
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
## 2023-08-02 DIAGNOSIS — E78.00 HYPERCHOLESTEREMIA: Primary | ICD-10-CM

## 2023-08-02 LAB
ALBUMIN SERPL BCP-MCNC: 3.8 G/DL (ref 3.5–5)
ALP SERPL-CCNC: 78 U/L (ref 46–116)
ALT SERPL W P-5'-P-CCNC: 44 U/L (ref 12–78)
ANION GAP SERPL CALCULATED.3IONS-SCNC: 6 MMOL/L
AST SERPL W P-5'-P-CCNC: 28 U/L (ref 5–45)
B BURGDOR IGG SERPL QL IA: POSITIVE
B BURGDOR IGG+IGM SER QL IA: POSITIVE
B BURGDOR IGM SERPL QL IA: NEGATIVE
BILIRUB SERPL-MCNC: 0.58 MG/DL (ref 0.2–1)
BUN SERPL-MCNC: 16 MG/DL (ref 5–25)
CALCIUM SERPL-MCNC: 9.9 MG/DL (ref 8.3–10.1)
CHLORIDE SERPL-SCNC: 112 MMOL/L (ref 96–108)
CHOLEST SERPL-MCNC: 200 MG/DL
CO2 SERPL-SCNC: 28 MMOL/L (ref 21–32)
CREAT SERPL-MCNC: 1.25 MG/DL (ref 0.6–1.3)
GFR SERPL CREATININE-BSD FRML MDRD: 65 ML/MIN/1.73SQ M
GLUCOSE P FAST SERPL-MCNC: 100 MG/DL (ref 65–99)
HDLC SERPL-MCNC: 41 MG/DL
LDLC SERPL DIRECT ASSAY-MCNC: 94 MG/DL (ref 0–100)
POTASSIUM SERPL-SCNC: 4.3 MMOL/L (ref 3.5–5.3)
PROT SERPL-MCNC: 7.4 G/DL (ref 6.4–8.4)
SODIUM SERPL-SCNC: 146 MMOL/L (ref 135–147)
TRIGL SERPL-MCNC: 430 MG/DL

## 2023-08-02 PROCEDURE — 36415 COLL VENOUS BLD VENIPUNCTURE: CPT

## 2023-08-02 PROCEDURE — 80053 COMPREHEN METABOLIC PANEL: CPT

## 2023-08-02 PROCEDURE — 86618 LYME DISEASE ANTIBODY: CPT

## 2023-08-02 PROCEDURE — 83721 ASSAY OF BLOOD LIPOPROTEIN: CPT

## 2023-08-02 PROCEDURE — 80061 LIPID PANEL: CPT

## 2023-08-02 PROCEDURE — 86617 LYME DISEASE ANTIBODY: CPT

## 2023-08-02 RX ORDER — FENOFIBRATE 54 MG/1
54 TABLET ORAL DAILY
Qty: 30 TABLET | Refills: 5 | Status: SHIPPED | OUTPATIENT
Start: 2023-08-02 | End: 2023-09-05

## 2023-08-17 ENCOUNTER — TELEPHONE (OUTPATIENT)
Age: 53
End: 2023-08-17

## 2023-08-17 DIAGNOSIS — K50.919 CROHN'S DISEASE WITH COMPLICATION, UNSPECIFIED GASTROINTESTINAL TRACT LOCATION (HCC): ICD-10-CM

## 2023-08-17 RX ORDER — INFLIXIMAB 100 MG/10ML
INJECTION, POWDER, LYOPHILIZED, FOR SOLUTION INTRAVENOUS
Qty: 4.4 EACH | Refills: 3 | Status: SHIPPED | OUTPATIENT
Start: 2023-08-17 | End: 2023-08-23

## 2023-08-17 NOTE — TELEPHONE ENCOUNTER
Received call from Kathy Blanton of [facility] requesting new order for inFLIXimab (REMICADE) 100 mg for this patient. Next infusion due 9/5/23. Please follow up with new order if appropriate.

## 2023-08-23 ENCOUNTER — NURSE TRIAGE (OUTPATIENT)
Age: 53
End: 2023-08-23

## 2023-08-23 RX ORDER — INFLIXIMAB 100 MG/10ML
INJECTION, POWDER, LYOPHILIZED, FOR SOLUTION INTRAVENOUS
Qty: 4.4 EACH | Refills: 3 | Status: SHIPPED | OUTPATIENT
Start: 2023-08-23

## 2023-08-23 NOTE — TELEPHONE ENCOUNTER
Supriya Ellison from [facility] calling in, she has not recived new order for remidase 100mg. I advised it was sent over on 8/17/23 .  She would like the script faxed to     Fax: [phone]

## 2023-09-03 DIAGNOSIS — E78.1 HYPERTRIGLYCERIDEMIA: ICD-10-CM

## 2023-09-05 RX ORDER — FENOFIBRATE 54 MG/1
54 TABLET ORAL DAILY
Qty: 90 TABLET | Refills: 2 | Status: SHIPPED | OUTPATIENT
Start: 2023-09-05

## 2024-01-03 ENCOUNTER — APPOINTMENT (OUTPATIENT)
Dept: RADIOLOGY | Facility: CLINIC | Age: 54
End: 2024-01-03
Payer: COMMERCIAL

## 2024-01-03 ENCOUNTER — OFFICE VISIT (OUTPATIENT)
Dept: FAMILY MEDICINE CLINIC | Facility: CLINIC | Age: 54
End: 2024-01-03
Payer: COMMERCIAL

## 2024-01-03 VITALS
WEIGHT: 222 LBS | DIASTOLIC BLOOD PRESSURE: 76 MMHG | OXYGEN SATURATION: 97 % | TEMPERATURE: 97.6 F | SYSTOLIC BLOOD PRESSURE: 108 MMHG | HEART RATE: 66 BPM | RESPIRATION RATE: 16 BRPM | BODY MASS INDEX: 32.78 KG/M2

## 2024-01-03 DIAGNOSIS — M25.551 BILATERAL HIP PAIN: ICD-10-CM

## 2024-01-03 DIAGNOSIS — M25.552 BILATERAL HIP PAIN: ICD-10-CM

## 2024-01-03 DIAGNOSIS — M25.552 BILATERAL HIP PAIN: Primary | ICD-10-CM

## 2024-01-03 DIAGNOSIS — M25.551 BILATERAL HIP PAIN: Primary | ICD-10-CM

## 2024-01-03 DIAGNOSIS — Z23 ENCOUNTER FOR IMMUNIZATION: ICD-10-CM

## 2024-01-03 PROCEDURE — 90686 IIV4 VACC NO PRSV 0.5 ML IM: CPT

## 2024-01-03 PROCEDURE — 73522 X-RAY EXAM HIPS BI 3-4 VIEWS: CPT

## 2024-01-03 PROCEDURE — 90471 IMMUNIZATION ADMIN: CPT

## 2024-01-03 PROCEDURE — 99213 OFFICE O/P EST LOW 20 MIN: CPT

## 2024-01-03 NOTE — ASSESSMENT & PLAN NOTE
Patient presents today with a main concern of bilateral hip pain that has been going on for many years. Description of pain is dull aching, and patient describes a clunking/clicking in both hips. Patient has never had any imaging of the hips completed before. Ordered X-ray of bilateral hips and pelvis today to assess for osteoarthritis or other joint disease. Patient is currently on Remicade and is considered immunosuppressed, however denies any fever or chills or severe hip pain that would suggest a deeper infection. Patient had a DEXA scan in 2019, no osteoporosis. Discussed with patient that this is likely age-related bone pain, but obtaining an x-ray of the area is a good first step to determine any other pathologies in the hips that would require orthopedic referral. Patient understanding and is agreeable.     Discussed with patient that physical therapy is likely a good idea to further assess the hip pain and help him decrease his pain level/stiffness. Patient is very agreeable with physical therapy. I discussed with patient that he could use OTC pain relief when hip pain becomes flared up, but he states the pain rarely gets so severe that he would need OTC pain relief. I also recommended muscle rubs, icy hot, or a heating pad for the hips to help with his discomfort. He understands and is agreeable.     Patient has an annual physical with Dr. Ordaz on 2/1/23, where hip pain can be reassessed and further recommendations can be made if PT does not improve his symptoms. We will reach out to him about x-ray results.     All questions answered, patient is agreeable with plan discussed today.

## 2024-01-03 NOTE — PROGRESS NOTES
Name: Naresh Mathew Jr.      : 1970      MRN: [id number]  Encounter Provider: Mira Esposito PA-C  Encounter Date: 1/3/2024   Encounter department: [facility]    Assessment & Plan     1. Bilateral hip pain  Assessment & Plan:  Patient presents today with a main concern of bilateral hip pain that has been going on for many years. Description of pain is dull aching, and patient describes a clunking/clicking in both hips. Patient has never had any imaging of the hips completed before. Ordered X-ray of bilateral hips and pelvis today to assess for osteoarthritis or other joint disease. Patient is currently on Remicade and is considered immunosuppressed, however denies any fever or chills or severe hip pain that would suggest a deeper infection. Patient had a DEXA scan in 2019, no osteoporosis. Discussed with patient that this is likely age-related bone pain, but obtaining an x-ray of the area is a good first step to determine any other pathologies in the hips that would require orthopedic referral. Patient understanding and is agreeable.     Discussed with patient that physical therapy is likely a good idea to further assess the hip pain and help him decrease his pain level/stiffness. Patient is very agreeable with physical therapy. I discussed with patient that he could use OTC pain relief when hip pain becomes flared up, but he states the pain rarely gets so severe that he would need OTC pain relief. I also recommended muscle rubs, icy hot, or a heating pad for the hips to help with his discomfort. He understands and is agreeable.     Patient has an annual physical with Dr. Ordaz on 23, where hip pain can be reassessed and further recommendations can be made if PT does not improve his symptoms. We will reach out to him about x-ray results.     All questions answered, patient is agreeable with plan discussed today.     Orders:  -     Ambulatory Referral to Physical Therapy;  [facility], [place]    Oncology Consultation     Date of Admission:  3/28/2020    Assessment & Plan   Mr. Maher is a 54 yo male with GBM, on atezolizumab + temozolomide clinical trial at [facility], currently in the adjuvant phase of treatment, admitted with gait instability, worsening HA, walking into doors.     Recommendations  - obtain outside imaging from [facility] as soon as possible. This is critical in order to fully understand what's going on and the extent  - continue dexamethasone 4 mg daily. Try not to escalate dex as he's on immunotherapy and we don't want to counteract the effect of immunotherapy as much as possible  - on the flip side, the increase in edema (if this is indeed the case, which it most likely is with the new sxs) might be immune-mediated -   - we will try to get in touch with his study nurse     # Cerebral edema  # Gait instability  # GBM, IDH WT, MGMT promotor unmethylated  Head CT showed progression in cerebral edema concentrated in the posterior R frontal lobe, R parietal lobe extending to the R temporal lobe, possible 2.7 x 2.5 x 0.3 cm mass at R frontoparietal junction, mass effect on R ventricle and 7 mm R->L midline shift. Brain MRI 3/28 showed 2.5 x 3.3 x 3.2 cm R temporoparietal mass with marked edema, increased in size compared to July 2019, with associated internal hemorrahge, 6 mm L midline shift, unchanged enlargement of the R temporal horn. Was compared to a July 2019 MRI; we do not have recent images from [facility]. It's difficult to tell any changes from the outside imaging just by reading the report on the last MRI there, but back in Sept 2019 it was measured as 2 x 2.5 cm. Nonetheless, the new sxs are worrisome for PD vs inflammatory response to atezo. Completed most recent course of temozolomide ~ 3/23, does 5 days on q28 days (200 mg/m2 = 390 mg). Followed by Dr. Davis at [facility]. Coordinator is named Jimmie Garza; wife  "Future  -     XR hips bilateral 3-4 vw w pelvis if performed; Future; Expected date: 01/03/2024    2. Encounter for immunization  -     influenza vaccine, quadrivalent, 0.5 mL, preservative-free, for adult and pediatric patients 6 mos+ (AFLURIA, FLUARIX, FLULAVAL, FLUZONE)           Subjective      Patient presents to the clinic today with concerns of back pain that started on Hilda unique. He was reaching for an air mattress that got stuck and when he pulled, the left side of his back began to hurt. He states at the time the pain was 7/10, and he used OTC pain relief as well as icy hot. The pain is much better now, and he rates it to be about 1/10. He denies any radiating symptoms down his legs. No numbness or tingling going down the legs or into the feet.     Patient wanted to discuss his hip pain that he has had for many years. He states both of his hips feel very stiff. He states he can hear a clunking and clicking in both sides of his hips. He states it feels like there is a grinding on both sides of the hips. He cannot tie his shoes as well as he once did due to stiffness. He states he can walk normally and does not have issues with ambulating, no lump. He describes the pain as a dull pain \"in the socket\". He feels he has less mobility of the joint than he used to. He does feel the right side is worse than the left. He does not currently take anything for the pain. Has not tried PT before.        Back Pain  This is a new problem. The current episode started 1 to 4 weeks ago. The problem occurs intermittently. The problem has been resolved since onset. The pain is present in the lumbar spine. The quality of the pain is described as aching. The pain does not radiate. The pain is at a severity of 1/10. The pain is mild. The pain is The same all the time. The symptoms are aggravated by bending. Pertinent negatives include no abdominal pain, bladder incontinence, bowel incontinence, chest pain, dysuria, fever, " can get us contact information but phone number in care everywhere is [phone] ([phone])    # SIADH  Labs c/w SIADH. On NaCl 1 g tid    # Immune-mediated thyroiditis  # Resultant hypothyroidism  Continue levothyroxine, currently at 125 mcg daily    # H/o seizures  On levitiracetam 1000 mg bid    Will continue to follow.    Nicki Gonzalez M.D.   Oncology Attending    Code Status    No Order    Reason for Consult    GBM, worsening edema    Chief Complaint   Gait issues    History is obtained from the patient and chart    History of Present Illness   Erasto Maher is a 55 year old male with GBM, on temozolomide + atezolizumab on clinical trial, who presented with worsening gait instability, walking into walls, worsening HA. Hospital course as above. Feeling a little better today, HA resolving. Hasn't gotten up to walk yet. No vision changes, N/V. Breathing fine, no new aches or pains, appetite good, no dysphagia, mouth sores, new numbness/tingling, or other new sxs. Has been doing incredibly well on treatment - was in [place] a couple of weeks ago skiing.     Chart indicates recently increased social stressors due to losing his job because of coronavirus.    Diagnosed in summer 2019. S/p R temporal craniotomy, total gross resection 6/27/19 Dr. Herman at [facility]. Started on clinical trial as above thereafter. Most recent restaging brain MRI 2/2020 showed SD.     Past Medical History   GBM  Immune-mediated hyperythroidism--> hypothyroidism    Past Surgical History   R temporal craniotomy, total gross resection 6/27/19 Dr. Herman at [facility]    Prior to Admission Medications   Prior to Admission Medications   Prescriptions Last Dose Informant Patient Reported? Taking?   ATEZOLIZUMAB IV Past Month at Unknown time  Yes Yes   Sig: Inject 840 mg into the vein every 14 days   acetaZOLAMIDE (DIAMOX) 250 MG tablet Past Month at Unknown time  Yes Yes   Sig: Take 250 mg by mouth 2 times daily as needed Taken  1-2 days before ascent and continue until descent. (for skiing)   fluticasone (FLONASE) 50 MCG/ACT nasal spray More than a month at Unknown time  Yes No   Sig: Spray 1 spray into both nostrils daily as needed    levETIRAcetam (KEPPRA) 1000 MG tablet 3/27/2020 at PM  Yes No   Sig: Take 1,000 mg by mouth 2 times daily    levothyroxine (SYNTHROID/LEVOTHROID) 125 MCG tablet 3/27/2020 at AM  Yes Yes   Sig: Take 125 mcg by mouth daily   loratadine (CLARITIN) 10 MG tablet More than a month at Unknown time  Yes No   Sig: Take 10 mg by mouth daily   melatonin 5 MG tablet Past Month at Unknown time  Yes Yes   Sig: Take 5 mg by mouth nightly as needed    olopatadine (PATANOL) 0.1 % ophthalmic solution More than a month at Unknown time  Yes No   Sig: Place 1-2 drops into both eyes daily as needed    omeprazole (PRILOSEC) 20 MG DR capsule 3/27/2020 at PM  Yes No   Sig: Take 20 mg by mouth At Bedtime    ondansetron (ZOFRAN) 8 MG tablet   Yes Yes   Sig: Take 8 mg by mouth 30 minutes before starting chemo. (Temozolomide and Atezolizumab)   senna (SENOKOT) 8.6 MG tablet Past Month at Unknown time  Yes Yes   Sig: Take 1 tablet by mouth daily as needed    temozolomide (TEMODAR) 140 MG capsule 3/19/2020  Yes Yes   Sig: Take 140 mg by mouth once daily on Days 1-5 of Cycle 7 (start: 3/16/2020) - Total Dose = 390 mg   temozolomide (TEMODAR) 250 MG capsule 3/19/2020  Yes Yes   Sig: Take 250 mg by mouth once daily on Days 1-5 of Cycle 7. Total dose: 390 mg      Facility-Administered Medications: None     Allergies   Allergies   Allergen Reactions     Erythromycin Diarrhea     PN: LW Reaction: GI Upset  PN: LW Reaction: GI Upset       No Clinical Screening - See Comments Rash     PN: LW Other1: -cats, hairy dogs  PN: LW Other1: -cats, hairy dogs       Social History   Social History     Socioeconomic History     Marital status:      Spouse name: None     Number of children: None     Years of education: None     Highest education  headaches, numbness, paresthesias, tingling, weakness or weight loss. Risk factors: immunosuppresive therapy. He has tried NSAIDs and analgesics for the symptoms. The treatment provided significant relief.     Review of Systems   Constitutional:  Negative for chills, fatigue, fever and weight loss.   Respiratory:  Negative for cough, chest tightness and shortness of breath.    Cardiovascular:  Negative for chest pain, palpitations and leg swelling.   Gastrointestinal:  Negative for abdominal pain and bowel incontinence.   Genitourinary:  Negative for bladder incontinence, difficulty urinating and dysuria.   Musculoskeletal:  Positive for arthralgias (hip pain bilateral) and back pain. Negative for joint swelling.   Skin:  Negative for color change and rash.   Neurological:  Negative for dizziness, tingling, weakness, light-headedness, numbness, headaches and paresthesias.   Psychiatric/Behavioral:  Negative for behavioral problems. The patient is not nervous/anxious.        Current Outpatient Medications on File Prior to Visit   Medication Sig    esomeprazole (NexIUM) 20 mg capsule Take 1 capsule (20 mg total) by mouth daily in the early morning    famotidine (PEPCID) 20 mg tablet TAKE 1 TABLET (20 MG TOTAL) BY MOUTH DAILY AT BEDTIME AS NEEDED FOR HEARTBURN    fenofibrate (TRICOR) 54 MG tablet TAKE 1 TABLET BY MOUTH DAILY    inFLIXimab (REMICADE) 100 mg 440 mg IV infusion every 8 weeks    losartan-hydrochlorothiazide (HYZAAR) 100-12.5 MG per tablet Take 1 tablet by mouth daily    metoprolol succinate (TOPROL-XL) 50 mg 24 hr tablet Take 1 tablet (50 mg total) by mouth daily       Objective     /76 (BP Location: Right arm, Patient Position: Sitting, Cuff Size: Standard)   Pulse 66   Temp 97.6 °F (36.4 °C) (Temporal)   Resp 16   Wt 101 kg (222 lb)   SpO2 97%   BMI 32.78 kg/m²     Physical Exam  Vitals and nursing note reviewed.   Constitutional:       General: He is not in acute distress.     Appearance:  Normal appearance. He is not ill-appearing or diaphoretic.   HENT:      Head: Normocephalic and atraumatic.   Pulmonary:      Effort: Pulmonary effort is normal. No respiratory distress.   Musculoskeletal:      Lumbar back: Negative right straight leg raise test and negative left straight leg raise test.        Back:       Right hip: No deformity, tenderness, bony tenderness or crepitus.      Left hip: No deformity, tenderness, bony tenderness or crepitus.      Comments: SHIVA test negative bilaterally  Reports stiffness during ROM portion of exam    Skin:     Coloration: Skin is not cyanotic or pale.   Neurological:      General: No focal deficit present.      Mental Status: He is alert and oriented to person, place, and time.   Psychiatric:         Mood and Affect: Mood normal.         Behavior: Behavior normal. Behavior is cooperative.         Judgment: Judgment normal.       Mira Esposito PA-C     level: None   Occupational History     None   Social Needs     Financial resource strain: None     Food insecurity     Worry: None     Inability: None     Transportation needs     Medical: None     Non-medical: None   Tobacco Use     Smoking status: Never Smoker     Smokeless tobacco: Never Used   Substance and Sexual Activity     Alcohol use: Not Currently     Frequency: 2-3 times a week     Drinks per session: 3 or 4     Drug use: Not Currently     Sexual activity: Not Currently     Partners: Female   Lifestyle     Physical activity     Days per week: None     Minutes per session: None     Stress: None   Relationships     Social connections     Talks on phone: None     Gets together: None     Attends Mandaen service: None     Active member of club or organization: None     Attends meetings of clubs or organizations: None     Relationship status: None     Intimate partner violence     Fear of current or ex partner: None     Emotionally abused: None     Physically abused: None     Forced sexual activity: None   Other Topics Concern     None   Social History Narrative     None     Family History   History reviewed. No pertinent family history.     Review of Systems   The 10 point Review of Systems is negative other than noted in the HPI    Physical Exam   Temp: 97.8  F (36.6  C) Temp src: Oral BP: 103/59 Pulse: 77   Resp: 14 SpO2: 97 % O2 Device: None (Room air)    Vital Signs with Ranges  Temp:  [95.9  F (35.5  C)-97.9  F (36.6  C)] 97.8  F (36.6  C)  Pulse:  [70-96] 77  Resp:  [14-18] 14  BP: (103-161)/(59-92) 103/59  SpO2:  [95 %-97 %] 97 %  211 lbs 3.2 oz  GEN: NAD, alert, awake  HEENT: no icterus/injection/pallor. OP clear  LUNGS: clear  CV: regular no m/r/g  ABD: soft, NT/ND, NABS, no masses  EXT: Warm, no edema    Data   I personally reviewed the head CT and brain MRI     Results for orders placed or performed during the hospital encounter of 03/28/20 (from the past 24 hour(s))   Osmolality urine   Result  Value Ref Range    Urine Osmolality 582 100 - 1,200 mmol/kg   Sodium random urine   Result Value Ref Range    Sodium Urine mmol/L 153 mmol/L   Specific gravity urine   Result Value Ref Range    Specific Gravity Urine 1.017 1.003 - 1.035   Sodium   Result Value Ref Range    Sodium 127 (L) 133 - 144 mmol/L   Basic metabolic panel   Result Value Ref Range    Sodium 125 (L) 133 - 144 mmol/L    Potassium 4.1 3.4 - 5.3 mmol/L    Chloride 94 94 - 109 mmol/L    Carbon Dioxide 26 20 - 32 mmol/L    Anion Gap 5 3 - 14 mmol/L    Glucose 124 (H) 70 - 99 mg/dL    Urea Nitrogen 9 7 - 30 mg/dL    Creatinine 0.61 (L) 0.66 - 1.25 mg/dL    GFR Estimate >90 >60 mL/min/[1.73_m2]    GFR Estimate If Black >90 >60 mL/min/[1.73_m2]    Calcium 8.5 8.5 - 10.1 mg/dL   CBC with platelets differential   Result Value Ref Range    WBC 9.8 4.0 - 11.0 10e9/L    RBC Count 4.38 (L) 4.4 - 5.9 10e12/L    Hemoglobin 13.4 13.3 - 17.7 g/dL    Hematocrit 38.8 (L) 40.0 - 53.0 %    MCV 89 78 - 100 fl    MCH 30.6 26.5 - 33.0 pg    MCHC 34.5 31.5 - 36.5 g/dL    RDW 11.9 10.0 - 15.0 %    Platelet Count 223 150 - 450 10e9/L    Diff Method Automated Method     % Neutrophils 83.6 %    % Lymphocytes 8.9 %    % Monocytes 6.9 %    % Eosinophils 0.2 %    % Basophils 0.1 %    % Immature Granulocytes 0.3 %    Nucleated RBCs 0 0 /100    Absolute Neutrophil 8.2 1.6 - 8.3 10e9/L    Absolute Lymphocytes 0.9 0.8 - 5.3 10e9/L    Absolute Monocytes 0.7 0.0 - 1.3 10e9/L    Absolute Eosinophils 0.0 0.0 - 0.7 10e9/L    Absolute Basophils 0.0 0.0 - 0.2 10e9/L    Abs Immature Granulocytes 0.0 0 - 0.4 10e9/L    Absolute Nucleated RBC 0.0    Magnesium   Result Value Ref Range    Magnesium 1.6 1.6 - 2.3 mg/dL   Phosphorus   Result Value Ref Range    Phosphorus 3.4 2.5 - 4.5 mg/dL   Sodium   Result Value Ref Range    Sodium 126 (L) 133 - 144 mmol/L   Sodium   Result Value Ref Range    Sodium 129 (L) 133 - 144 mmol/L   TSH   Result Value Ref Range    TSH 13.70 (H) 0.40 - 4.00 mU/L

## 2024-01-05 DIAGNOSIS — M25.552 BILATERAL HIP PAIN: Primary | ICD-10-CM

## 2024-01-05 DIAGNOSIS — M25.551 BILATERAL HIP PAIN: Primary | ICD-10-CM

## 2024-01-29 DIAGNOSIS — I10 PRIMARY HYPERTENSION: ICD-10-CM

## 2024-01-29 RX ORDER — LOSARTAN POTASSIUM AND HYDROCHLOROTHIAZIDE 12.5; 1 MG/1; MG/1
1 TABLET ORAL DAILY
Qty: 90 TABLET | Refills: 1 | Status: SHIPPED | OUTPATIENT
Start: 2024-01-29

## 2024-02-22 DIAGNOSIS — I10 ESSENTIAL HYPERTENSION: ICD-10-CM

## 2024-02-22 RX ORDER — METOPROLOL SUCCINATE 50 MG/1
50 TABLET, EXTENDED RELEASE ORAL DAILY
Qty: 90 TABLET | Refills: 1 | Status: SHIPPED | OUTPATIENT
Start: 2024-02-22

## 2024-04-12 ENCOUNTER — TELEPHONE (OUTPATIENT)
Age: 54
End: 2024-04-12

## 2024-04-12 DIAGNOSIS — K50.919 CROHN'S DISEASE WITH COMPLICATION, UNSPECIFIED GASTROINTESTINAL TRACT LOCATION (HCC): ICD-10-CM

## 2024-04-12 RX ORDER — INFLIXIMAB 100 MG/10ML
INJECTION, POWDER, LYOPHILIZED, FOR SOLUTION INTRAVENOUS
Qty: 5 EACH | Refills: 3 | Status: SHIPPED | OUTPATIENT
Start: 2024-04-12

## 2024-04-12 NOTE — TELEPHONE ENCOUNTER
Reason for call:   [x] Refill   [] Prior Auth  [] Other:     Office:   [] PCP/Provider -   [x] Specialty/Provider - Rheum / Babio    Medication: Remicade IV infusion    Dose/Frequency: 100mg (440mg every 8 weeks)    Quantity: 5 vials    Pharmacy: [facility] ([facility]) - [place], [place] - [address]     Does the patient have enough for 3 days?   [x] Yes   [] No - Send as HP to POD

## 2024-04-12 NOTE — TELEPHONE ENCOUNTER
[facility] reports that patient's Remicade prescription will require authorization renewal through the insurance. They are requesting a copy of the most recent office/clinical notes. Notes must be dated within the last year (Apr 2023-Apr 2024).    Please fax to:  [phone]

## 2024-04-12 NOTE — TELEPHONE ENCOUNTER
Per patient's phone call. He does not have access to his Mychart. He is requesting that the letter be emailed to him.

## 2024-04-18 DIAGNOSIS — Z00.6 ENCOUNTER FOR EXAMINATION FOR NORMAL COMPARISON OR CONTROL IN CLINICAL RESEARCH PROGRAM: ICD-10-CM

## 2024-04-24 ENCOUNTER — OFFICE VISIT (OUTPATIENT)
Dept: GASTROENTEROLOGY | Facility: AMBULARY SURGERY CENTER | Age: 54
End: 2024-04-24
Payer: COMMERCIAL

## 2024-04-24 VITALS
WEIGHT: 223 LBS | SYSTOLIC BLOOD PRESSURE: 132 MMHG | DIASTOLIC BLOOD PRESSURE: 97 MMHG | HEART RATE: 60 BPM | HEIGHT: 69 IN | OXYGEN SATURATION: 97 % | BODY MASS INDEX: 33.03 KG/M2

## 2024-04-24 DIAGNOSIS — K50.919 CROHN'S DISEASE WITH COMPLICATION, UNSPECIFIED GASTROINTESTINAL TRACT LOCATION (HCC): Primary | ICD-10-CM

## 2024-04-24 DIAGNOSIS — K21.9 GASTROESOPHAGEAL REFLUX DISEASE WITHOUT ESOPHAGITIS: ICD-10-CM

## 2024-04-24 PROCEDURE — 99213 OFFICE O/P EST LOW 20 MIN: CPT | Performed by: PHYSICIAN ASSISTANT

## 2024-04-24 NOTE — PROGRESS NOTES
[facility] - Outpatient Follow-up Note  Naresh Mathew Jr. 53 y.o. male MRN: [id number]  Encounter: [id number]          ASSESSMENT AND PLAN:      53-year-old male with ileocolonic Crohn's disease s/p resection and primary anastomosis maintained on infliximab every 8 weeks who presents the office for yearly follow-up.    Ileocolonic Crohn's disease in remission  Maintained on Remicade every 8 weeks.  He has no complaints at this time.  Last colonoscopy in 2021.  No recent lab work.    -Will obtain yearly hepatitis B testing, TB testing  -Will check fecal calprotectin.  - Patient's next infusion is next week, recommend trying to obtain lab work for drug level and antibody  -If any elevation of inflammatory marker, recommend CT enterography.    -Continue to avoid NSAIDs, smoking alcohol use.  - Next colonoscopy 2026.  - Send message to IBD pool to ensure Remicade gets reauthorized to avoid any delays in infusions.    2. GERD  Controlled with Nexium 20 mg in the morning and Pepcid as needed.    -Continue over-the-counter Nexium.  - Continue Pepcid as needed.  - Monitor      Patient is currently asymptomatic and would like to avoid office visits unless necessary.  Will plan for repeat follow-up in 1 year or earlier if needed.  If patient is doing well, please offer telemedicine visit as this will be easier for him to attend.  ______________________________________________________________________    SUBJECTIVE:      Naresh Mathew Jr. is a 53 y.o. male with history of ileocolonic Crohn's disease status post resection and primary anastomosis currently maintained on infliximab every 8 weeks who presents to the office for yearly follow-up.    Patient reports he is doing very well.  He denies any current symptoms.  His bowel movements are regular.  He has no abdominal pain.  No blood in the stool.  No unintentional weight loss.  Denies any change in vision, excessive joint pains.  He tries to avoid NSAIDs.   No smoking.  Social alcohol use.    There was concern around  for elevated liver enzymes however repeat have been normal.  Ultrasound is relatively unremarkable besides gallstones and fatty liver.    Reflux is managed with Nexium 20 mg daily and Pepcid as needed.    Last colonoscopy 2021 with mild ulceration of ileocolonic anastomosis and repeat recommended in 5 years.    REVIEW OF SYSTEMS IS OTHERWISE NEGATIVE.      Historical Information   Past Medical History:   Diagnosis Date    Allergic     Anemia     Colon polyp     Crohn's disease (HCC)     GERD (gastroesophageal reflux disease)     Hypertension     Inflammatory bowel disease     Melanoma (HCC)      Past Surgical History:   Procedure Laterality Date    APPENDECTOMY      COLON SURGERY      x 2 bowel resections    COLONOSCOPY  2021    HAND SURGERY      WY COLONOSCOPY FLX DX W/COLLJ SPEC WHEN PFRMD N/A 2018    Procedure: EGD AND COLONOSCOPY;  Surgeon: Olu Price MD;  Location: [facility];  Service: Gastroenterology     Social History   Social History     Substance and Sexual Activity   Alcohol Use Yes    Alcohol/week: 0.0 standard drinks of alcohol    Comment: occas     Social History     Substance and Sexual Activity   Drug Use No     Social History     Tobacco Use   Smoking Status Former    Current packs/day: 0.00    Types: Cigarettes    Start date:     Quit date:     Years since quittin.3   Smokeless Tobacco Never     Family History   Problem Relation Age of Onset    Diabetes Mother     Cancer Father         colon    Hypertension Father     Crohn's disease Paternal Uncle        Meds/Allergies       Current Outpatient Medications:     esomeprazole (NexIUM) 20 mg capsule    famotidine (PEPCID) 20 mg tablet    fenofibrate (TRICOR) 54 MG tablet    inFLIXimab (REMICADE) 100 mg    losartan-hydrochlorothiazide (HYZAAR) 100-12.5 MG per tablet    metoprolol succinate (TOPROL-XL) 50 mg 24 hr tablet    No Known  "Allergies        Objective     Blood pressure 132/97, pulse 60, height 5' 9\" (1.753 m), weight 101 kg (223 lb), SpO2 97%. Body mass index is 32.93 kg/m².      PHYSICAL EXAM:      General Appearance:   Alert, cooperative, no distress   HEENT:   Normocephalic, atraumatic, anicteric.     Neck:  Supple, symmetrical, trachea midline   Lungs:   Clear to auscultation bilaterally; no rales, rhonchi or wheezing; respirations unlabored    Heart::   Regular rate and rhythm; no murmur, rub, or gallop.   Abdomen:   Soft, non-tender, non-distended; normal bowel sounds; no masses, no organomegaly. Benign abdomen.   Genitalia:   Deferred    Rectal:   Deferred    Extremities:  No cyanosis, clubbing or edema    Pulses:  2+ and symmetric    Skin:  No jaundice, rashes, or lesions    Lymph nodes:  No palpable cervical lymphadenopathy        Lab Results:   No visits with results within 1 Day(s) from this visit.   Latest known visit with results is:   Appointment on 08/02/2023   Component Date Value    Sodium 08/02/2023 146     Potassium 08/02/2023 4.3     Chloride 08/02/2023 112 (H)     CO2 08/02/2023 28     ANION GAP 08/02/2023 6     BUN 08/02/2023 16     Creatinine 08/02/2023 1.25     Glucose, Fasting 08/02/2023 100 (H)     Calcium 08/02/2023 9.9     AST 08/02/2023 28     ALT 08/02/2023 44     Alkaline Phosphatase 08/02/2023 78     Total Protein 08/02/2023 7.4     Albumin 08/02/2023 3.8     Total Bilirubin 08/02/2023 0.58     eGFR 08/02/2023 65     Cholesterol 08/02/2023 200     Triglycerides 08/02/2023 430 (H)     HDL, Direct 08/02/2023 41     LDL Calculated 08/02/2023      Lyme Total Antibodies 08/02/2023 Positive (A)     LDL Direct 08/02/2023 94     Lyme IGM 08/02/2023 Negative     Lyme IGG 08/02/2023 Positive (A)          Radiology Results:   No results found.  "

## 2024-05-03 ENCOUNTER — APPOINTMENT (OUTPATIENT)
Dept: LAB | Facility: MEDICAL CENTER | Age: 54
End: 2024-05-03
Payer: COMMERCIAL

## 2024-05-03 DIAGNOSIS — Z00.6 ENCOUNTER FOR EXAMINATION FOR NORMAL COMPARISON OR CONTROL IN CLINICAL RESEARCH PROGRAM: ICD-10-CM

## 2024-05-03 DIAGNOSIS — K50.919 CROHN'S DISEASE WITH COMPLICATION, UNSPECIFIED GASTROINTESTINAL TRACT LOCATION (HCC): ICD-10-CM

## 2024-05-03 LAB
ALBUMIN SERPL BCP-MCNC: 4.2 G/DL (ref 3.5–5)
ALP SERPL-CCNC: 63 U/L (ref 34–104)
ALT SERPL W P-5'-P-CCNC: 38 U/L (ref 7–52)
ANION GAP SERPL CALCULATED.3IONS-SCNC: 10 MMOL/L (ref 4–13)
AST SERPL W P-5'-P-CCNC: 32 U/L (ref 13–39)
BILIRUB DIRECT SERPL-MCNC: 0.11 MG/DL (ref 0–0.2)
BILIRUB SERPL-MCNC: 0.62 MG/DL (ref 0.2–1)
BUN SERPL-MCNC: 19 MG/DL (ref 5–25)
CALCIUM SERPL-MCNC: 9.4 MG/DL (ref 8.4–10.2)
CHLORIDE SERPL-SCNC: 108 MMOL/L (ref 96–108)
CO2 SERPL-SCNC: 24 MMOL/L (ref 21–32)
CREAT SERPL-MCNC: 0.97 MG/DL (ref 0.6–1.3)
CRP SERPL QL: 2.2 MG/L
ERYTHROCYTE [DISTWIDTH] IN BLOOD BY AUTOMATED COUNT: 13 % (ref 11.6–15.1)
GFR SERPL CREATININE-BSD FRML MDRD: 88 ML/MIN/1.73SQ M
GLUCOSE P FAST SERPL-MCNC: 95 MG/DL (ref 65–99)
HBV SURFACE AG SER QL: NORMAL
HCT VFR BLD AUTO: 45.4 % (ref 36.5–49.3)
HGB BLD-MCNC: 15.5 G/DL (ref 12–17)
MCH RBC QN AUTO: 32.4 PG (ref 26.8–34.3)
MCHC RBC AUTO-ENTMCNC: 34.1 G/DL (ref 31.4–37.4)
MCV RBC AUTO: 95 FL (ref 82–98)
PLATELET # BLD AUTO: 188 THOUSANDS/UL (ref 149–390)
PMV BLD AUTO: 11.8 FL (ref 8.9–12.7)
POTASSIUM SERPL-SCNC: 4.1 MMOL/L (ref 3.5–5.3)
PROT SERPL-MCNC: 6.7 G/DL (ref 6.4–8.4)
RBC # BLD AUTO: 4.78 MILLION/UL (ref 3.88–5.62)
SODIUM SERPL-SCNC: 142 MMOL/L (ref 135–147)
WBC # BLD AUTO: 7.09 THOUSAND/UL (ref 4.31–10.16)

## 2024-05-03 PROCEDURE — 87340 HEPATITIS B SURFACE AG IA: CPT

## 2024-05-03 PROCEDURE — 36415 COLL VENOUS BLD VENIPUNCTURE: CPT

## 2024-05-03 PROCEDURE — 85027 COMPLETE CBC AUTOMATED: CPT

## 2024-05-03 PROCEDURE — 86480 TB TEST CELL IMMUN MEASURE: CPT

## 2024-05-03 PROCEDURE — 83993 ASSAY FOR CALPROTECTIN FECAL: CPT

## 2024-05-03 PROCEDURE — 80048 BASIC METABOLIC PNL TOTAL CA: CPT

## 2024-05-03 PROCEDURE — 80230 DRUG ASSAY INFLIXIMAB: CPT

## 2024-05-03 PROCEDURE — 86140 C-REACTIVE PROTEIN: CPT

## 2024-05-03 PROCEDURE — 82397 CHEMILUMINESCENT ASSAY: CPT

## 2024-05-03 PROCEDURE — 80076 HEPATIC FUNCTION PANEL: CPT

## 2024-05-05 LAB
GAMMA INTERFERON BACKGROUND BLD IA-ACNC: 0.09 IU/ML
M TB IFN-G BLD-IMP: NEGATIVE
M TB IFN-G CD4+ BCKGRND COR BLD-ACNC: 0 IU/ML
M TB IFN-G CD4+ BCKGRND COR BLD-ACNC: 0 IU/ML
MITOGEN IGNF BCKGRD COR BLD-ACNC: 9.91 IU/ML

## 2024-05-07 LAB — CALPROTECTIN STL-MCNT: 57 UG/G (ref 0–120)

## 2024-05-13 LAB
INFLIXIMAB AB SERPL-MCNC: 37 NG/ML
INFLIXIMAB SERPL-MCNC: 4.1 UG/ML

## 2024-05-19 LAB
APOB+LDLR+PCSK9 GENE MUT ANL BLD/T: NOT DETECTED
BRCA1+BRCA2 DEL+DUP + FULL MUT ANL BLD/T: NOT DETECTED
MLH1+MSH2+MSH6+PMS2 GN DEL+DUP+FUL M: NOT DETECTED

## 2024-05-23 DIAGNOSIS — E78.1 HYPERTRIGLYCERIDEMIA: ICD-10-CM

## 2024-05-24 RX ORDER — FENOFIBRATE 54 MG/1
54 TABLET ORAL DAILY
Qty: 90 TABLET | Refills: 1 | Status: SHIPPED | OUTPATIENT
Start: 2024-05-24

## 2024-05-31 DIAGNOSIS — I10 PRIMARY HYPERTENSION: ICD-10-CM

## 2024-05-31 DIAGNOSIS — I10 ESSENTIAL HYPERTENSION: ICD-10-CM

## 2024-05-31 RX ORDER — METOPROLOL SUCCINATE 50 MG/1
50 TABLET, EXTENDED RELEASE ORAL DAILY
Qty: 90 TABLET | Refills: 1 | Status: SHIPPED | OUTPATIENT
Start: 2024-05-31

## 2024-05-31 RX ORDER — LOSARTAN POTASSIUM AND HYDROCHLOROTHIAZIDE 12.5; 1 MG/1; MG/1
1 TABLET ORAL DAILY
Qty: 90 TABLET | Refills: 1 | Status: SHIPPED | OUTPATIENT
Start: 2024-05-31

## 2024-10-21 DIAGNOSIS — E78.1 HYPERTRIGLYCERIDEMIA: ICD-10-CM

## 2024-10-22 RX ORDER — FENOFIBRATE 54 MG/1
54 TABLET ORAL DAILY
Qty: 90 TABLET | Refills: 0 | Status: SHIPPED | OUTPATIENT
Start: 2024-10-22

## 2024-10-22 NOTE — TELEPHONE ENCOUNTER
I have never prescribed this medication for this patient, this refill should not have been sent in my name. I will forward to the appropriate provider.

## 2024-10-22 NOTE — TELEPHONE ENCOUNTER
Lvm asking pt to call back to schedule    Last ov: 1/3/24     If pt calls back please schedule pt for physical.

## 2024-10-31 ENCOUNTER — OFFICE VISIT (OUTPATIENT)
Dept: FAMILY MEDICINE CLINIC | Facility: CLINIC | Age: 54
End: 2024-10-31
Payer: COMMERCIAL

## 2024-10-31 VITALS
BODY MASS INDEX: 32.67 KG/M2 | WEIGHT: 220.6 LBS | HEART RATE: 60 BPM | SYSTOLIC BLOOD PRESSURE: 119 MMHG | DIASTOLIC BLOOD PRESSURE: 80 MMHG | TEMPERATURE: 97.3 F | HEIGHT: 69 IN | OXYGEN SATURATION: 97 %

## 2024-10-31 DIAGNOSIS — M25.812 MASS OF JOINT OF LEFT SHOULDER: ICD-10-CM

## 2024-10-31 DIAGNOSIS — I10 PRIMARY HYPERTENSION: ICD-10-CM

## 2024-10-31 DIAGNOSIS — Z13.29 SCREENING FOR THYROID DISORDER: ICD-10-CM

## 2024-10-31 DIAGNOSIS — E78.1 HYPERTRIGLYCERIDEMIA: ICD-10-CM

## 2024-10-31 DIAGNOSIS — Z23 NEED FOR COVID-19 VACCINE: ICD-10-CM

## 2024-10-31 DIAGNOSIS — Z23 ENCOUNTER FOR IMMUNIZATION: ICD-10-CM

## 2024-10-31 DIAGNOSIS — Z13.1 SCREENING FOR DIABETES MELLITUS: ICD-10-CM

## 2024-10-31 DIAGNOSIS — I10 ESSENTIAL HYPERTENSION: ICD-10-CM

## 2024-10-31 DIAGNOSIS — Z00.00 ANNUAL PHYSICAL EXAM: Primary | ICD-10-CM

## 2024-10-31 PROCEDURE — 90673 RIV3 VACCINE NO PRESERV IM: CPT

## 2024-10-31 PROCEDURE — 90480 ADMN SARSCOV2 VAC 1/ONLY CMP: CPT

## 2024-10-31 PROCEDURE — 99396 PREV VISIT EST AGE 40-64: CPT | Performed by: FAMILY MEDICINE

## 2024-10-31 PROCEDURE — 99214 OFFICE O/P EST MOD 30 MIN: CPT | Performed by: FAMILY MEDICINE

## 2024-10-31 PROCEDURE — 91320 SARSCV2 VAC 30MCG TRS-SUC IM: CPT

## 2024-10-31 PROCEDURE — 90471 IMMUNIZATION ADMIN: CPT

## 2024-10-31 RX ORDER — LOSARTAN POTASSIUM AND HYDROCHLOROTHIAZIDE 12.5; 1 MG/1; MG/1
1 TABLET ORAL DAILY
Qty: 90 TABLET | Refills: 1 | Status: SHIPPED | OUTPATIENT
Start: 2024-10-31

## 2024-10-31 RX ORDER — METOPROLOL SUCCINATE 50 MG/1
50 TABLET, EXTENDED RELEASE ORAL DAILY
Qty: 90 TABLET | Refills: 1 | Status: SHIPPED | OUTPATIENT
Start: 2024-10-31

## 2024-10-31 NOTE — PROGRESS NOTES
Adult Annual Physical  Name: Naresh Mathew Jr.      : 1970      MRN: [id number]  Encounter Provider: Bahman Ordaz DO  Encounter Date: 10/31/2024   Encounter department: [facility]    Assessment & Plan  Annual physical exam         Primary hypertension    Orders:    losartan-hydrochlorothiazide (HYZAAR) 100-12.5 MG per tablet; Take 1 tablet by mouth daily    Hypertriglyceridemia    Orders:    Comprehensive metabolic panel; Future    Lipid Panel with Direct LDL reflex; Future    Mass of joint of left shoulder         Need for COVID-19 vaccine    Orders:    COVID-19 Pfizer mRNA vaccine 12 yr and older (Comirnaty pre-filled syringe)    Encounter for immunization    Orders:    influenza vaccine, recombinant, PF, 0.5 mL IM (Flublok)    Screening for thyroid disorder    Orders:    TSH, 3rd generation with Free T4 reflex; Future    Screening for diabetes mellitus    Orders:    Hemoglobin A1C; Future    Essential hypertension    Orders:    metoprolol succinate (TOPROL-XL) 50 mg 24 hr tablet; Take 1 tablet (50 mg total) by mouth daily      Immunizations and preventive care screenings were discussed with patient today. Appropriate education was printed on patient's after visit summary.    Discussed risks and benefits of prostate cancer screening. We discussed the controversial history of PSA screening for prostate cancer in the [place] as well as the risk of over detection and over treatment of prostate cancer by way of PSA screening.  The patient understands that PSA blood testing is an imperfect way to screen for prostate cancer and that elevated PSA levels in the blood may also be caused by infection, inflammation, prostatic trauma or manipulation, urological procedures, or by benign prostatic enlargement.    The role of the digital rectal examination in prostate cancer screening was also discussed and I discussed with him that there is large interobserver variability in the findings of  digital rectal examination.    Counseling:  Alcohol/drug use: discussed moderation in alcohol intake, the recommendations for healthy alcohol use, and avoidance of illicit drug use.  Dental Health: discussed importance of regular tooth brushing, flossing, and dental visits.  Injury prevention: discussed safety/seat belts, safety helmets, smoke detectors, carbon monoxide detectors, and smoking near bedding or upholstery.  Sexual health: discussed sexually transmitted diseases, partner selection, use of condoms, avoidance of unintended pregnancy, and contraceptive alternatives.  Exercise: the importance of regular exercise/physical activity was discussed. Recommend exercise 3-5 times per week for at least 30 minutes.          History of Present Illness     Adult Annual Physical:  Patient presents for annual physical.     Diet and Physical Activity:  - Diet/Nutrition: well balanced diet.  - Exercise: moderate cardiovascular exercise.    Depression Screening:  - PHQ-2 Score: 0    General Health:  - Sleep: sleeps well.  - Hearing: normal hearing bilateral ears.  - Vision: goes for regular eye exams.  - Dental: regular dental visits.     Health:  - History of STDs: no.   - Urinary symptoms: none.     Review of Systems   Constitutional:  Negative for activity change, chills, fatigue and fever.   HENT:  Negative for congestion, ear pain, sinus pressure and sore throat.    Eyes:  Negative for redness, itching and visual disturbance.   Respiratory:  Negative for cough and shortness of breath.    Cardiovascular:  Negative for chest pain and palpitations.   Gastrointestinal:  Negative for abdominal pain, diarrhea and nausea.   Endocrine: Negative for cold intolerance and heat intolerance.   Genitourinary:  Negative for dysuria, flank pain and frequency.   Musculoskeletal:  Negative for arthralgias, back pain, gait problem and myalgias.   Skin:  Negative for color change.   Allergic/Immunologic: Negative for environmental  "allergies.   Neurological:  Negative for dizziness, numbness and headaches.   Psychiatric/Behavioral:  Negative for behavioral problems and sleep disturbance.      Medical History Reviewed by provider this encounter:       Current Outpatient Medications on File Prior to Visit   Medication Sig Dispense Refill    esomeprazole (NexIUM) 20 mg capsule Take 1 capsule (20 mg total) by mouth daily in the early morning 90 capsule 2    famotidine (PEPCID) 20 mg tablet TAKE 1 TABLET (20 MG TOTAL) BY MOUTH DAILY AT BEDTIME AS NEEDED FOR HEARTBURN 90 tablet 1    fenofibrate (TRICOR) 54 MG tablet TAKE 1 TABLET BY MOUTH EVERY DAY 90 tablet 0    inFLIXimab (REMICADE) 100 mg 440 mg IV infusion every 8 weeks 5 each 3    [DISCONTINUED] losartan-hydrochlorothiazide (HYZAAR) 100-12.5 MG per tablet TAKE 1 TABLET BY MOUTH EVERY DAY 90 tablet 1    [DISCONTINUED] metoprolol succinate (TOPROL-XL) 50 mg 24 hr tablet TAKE 1 TABLET BY MOUTH EVERY DAY 90 tablet 1     No current facility-administered medications on file prior to visit.      Social History     Tobacco Use    Smoking status: Former     Current packs/day: 0.00     Types: Cigarettes     Start date:      Quit date:      Years since quittin.8    Smokeless tobacco: Never   Vaping Use    Vaping status: Never Used   Substance and Sexual Activity    Alcohol use: Yes     Alcohol/week: 0.0 standard drinks of alcohol     Comment: occas    Drug use: No    Sexual activity: Yes     Partners: Female     Birth control/protection: Ring       Objective     /80 (BP Location: Left arm, Patient Position: Sitting, Cuff Size: Adult)   Pulse 60   Temp (!) 97.3 °F (36.3 °C) (Temporal)   Ht 5' 9\" (1.753 m)   Wt 100 kg (220 lb 9.6 oz)   SpO2 97%   BMI 32.58 kg/m²     Physical Exam  Vitals reviewed.   Constitutional:       General: He is not in acute distress.     Appearance: Normal appearance. He is well-developed.   HENT:      Head: Normocephalic and atraumatic.      Right Ear: " Tympanic membrane, ear canal and external ear normal. There is no impacted cerumen.      Left Ear: Tympanic membrane, ear canal and external ear normal. There is no impacted cerumen.      Nose: Nose normal. No congestion or rhinorrhea.      Mouth/Throat:      Mouth: Mucous membranes are moist.      Pharynx: No oropharyngeal exudate or posterior oropharyngeal erythema.   Eyes:      General: No scleral icterus.        Right eye: No discharge.         Left eye: No discharge.      Extraocular Movements: Extraocular movements intact.      Conjunctiva/sclera: Conjunctivae normal.      Pupils: Pupils are equal, round, and reactive to light.   Neck:      Trachea: No tracheal deviation.   Cardiovascular:      Rate and Rhythm: Normal rate and regular rhythm.      Pulses: Normal pulses.           Dorsalis pedis pulses are 2+ on the right side and 2+ on the left side.        Posterior tibial pulses are 2+ on the right side and 2+ on the left side.      Heart sounds: Normal heart sounds. No murmur heard.     No friction rub. No gallop.   Pulmonary:      Effort: Pulmonary effort is normal. No respiratory distress.      Breath sounds: Normal breath sounds. No wheezing, rhonchi or rales.   Abdominal:      General: Bowel sounds are normal. There is no distension.      Palpations: Abdomen is soft.      Tenderness: There is no abdominal tenderness. There is no guarding or rebound.   Musculoskeletal:         General: Normal range of motion.      Cervical back: Normal range of motion and neck supple.      Right lower leg: No edema.      Left lower leg: No edema.   Lymphadenopathy:      Head:      Right side of head: No submental or submandibular adenopathy.      Left side of head: No submental or submandibular adenopathy.      Cervical: No cervical adenopathy.      Right cervical: No superficial, deep or posterior cervical adenopathy.     Left cervical: No superficial, deep or posterior cervical adenopathy.   Skin:     General: Skin is  warm and dry.      Findings: No erythema.   Neurological:      General: No focal deficit present.      Mental Status: He is alert and oriented to person, place, and time.      Cranial Nerves: No cranial nerve deficit.      Sensory: Sensation is intact. No sensory deficit.      Motor: Motor function is intact.   Psychiatric:         Attention and Perception: Attention and perception normal.         Mood and Affect: Mood is not anxious or depressed.         Speech: Speech normal.         Behavior: Behavior normal.         Thought Content: Thought content normal.         Judgment: Judgment normal.

## 2024-10-31 NOTE — ASSESSMENT & PLAN NOTE
Blood pressure appears very well-controlled today.  Continue at this time with his current treatment of metoprolol, losartan as well as hydrochlorothiazide.  Orders:    losartan-hydrochlorothiazide (HYZAAR) 100-12.5 MG per tablet; Take 1 tablet by mouth daily

## 2024-10-31 NOTE — ASSESSMENT & PLAN NOTE
Orders:    losartan-hydrochlorothiazide (HYZAAR) 100-12.5 MG per tablet; Take 1 tablet by mouth daily

## 2024-10-31 NOTE — PATIENT INSTRUCTIONS
"Patient Education     Routine physical for adults   The Basics   Written by the doctors and editors at [facility]   What is a physical? -- A physical is a routine visit, or \"check-up,\" with your doctor. You might also hear it called a \"wellness visit\" or \"preventive visit.\"  During each visit, the doctor will:   Ask about your physical and mental health   Ask about your habits, behaviors, and lifestyle   Do an exam   Give you vaccines if needed   Talk to you about any medicines you take   Give advice about your health   Answer your questions  Getting regular check-ups is an important part of taking care of your health. It can help your doctor find and treat any problems you have. But it's also important for preventing health problems.  A routine physical is different from a \"sick visit.\" A sick visit is when you see a doctor because of a health concern or problem. Since physicals are scheduled ahead of time, you can think about what you want to ask the doctor.  How often should I get a physical? -- It depends on your age and health. In general, for people age 21 years and older:   If you are younger than 50 years, you might be able to get a physical every 3 years.   If you are 50 years or older, your doctor might recommend a physical every year.  If you have an ongoing health condition, like diabetes or high blood pressure, your doctor will probably want to see you more often.  What happens during a physical? -- In general, each visit will include:   Physical exam - The doctor or nurse will check your height, weight, heart rate, and blood pressure. They will also look at your eyes and ears. They will ask about how you are feeling and whether you have any symptoms that bother you.   Medicines - It's a good idea to bring a list of all the medicines you take to each doctor visit. Your doctor will talk to you about your medicines and answer any questions. Tell them if you are having any side effects that bother you. You " "should also tell them if you are having trouble paying for any of your medicines.   Habits and behaviors - This includes:   Your diet   Your exercise habits   Whether you smoke, drink alcohol, or use drugs   Whether you are sexually active   Whether you feel safe at home  Your doctor will talk to you about things you can do to improve your health and lower your risk of health problems. They will also offer help and support. For example, if you want to quit smoking, they can give you advice and might prescribe medicines. If you want to improve your diet or get more physical activity, they can help you with this, too.   Lab tests, if needed - The tests you get will depend on your age and situation. For example, your doctor might want to check your:   Cholesterol   Blood sugar   Iron level   Vaccines - The recommended vaccines will depend on your age, health, and what vaccines you already had. Vaccines are very important because they can prevent certain serious or deadly infections.   Discussion of screening - \"Screening\" means checking for diseases or other health problems before they cause symptoms. Your doctor can recommend screening based on your age, risk, and preferences. This might include tests to check for:   Cancer, such as breast, prostate, cervical, ovarian, colorectal, prostate, lung, or skin cancer   Sexually transmitted infections, such as chlamydia and gonorrhea   Mental health conditions like depression and anxiety  Your doctor will talk to you about the different types of screening tests. They can help you decide which screenings to have. They can also explain what the results might mean.   Answering questions - The physical is a good time to ask the doctor or nurse questions about your health. If needed, they can refer you to other doctors or specialists, too.  Adults older than 65 years often need other care, too. As you get older, your doctor will talk to you about:   How to prevent falling at " home   Hearing or vision tests   Memory testing   How to take your medicines safely   Making sure that you have the help and support you need at home  All topics are updated as new evidence becomes available and our peer review process is complete.  This topic retrieved from [employer] on: May 02, 2024.  Topic 279059 Version 1.0  Release: 32.4.3 - C32.122  © 2024 UpToDate, Inc. and/or its affiliates. All rights reserved.  Consumer Information Use and Disclaimer   Disclaimer: This generalized information is a limited summary of diagnosis, treatment, and/or medication information. It is not meant to be comprehensive and should be used as a tool to help the user understand and/or assess potential diagnostic and treatment options. It does NOT include all information about conditions, treatments, medications, side effects, or risks that may apply to a specific patient. It is not intended to be medical advice or a substitute for the medical advice, diagnosis, or treatment of a health care provider based on the health care provider's examination and assessment of a patient's specific and unique circumstances. Patients must speak with a health care provider for complete information about their health, medical questions, and treatment options, including any risks or benefits regarding use of medications. This information does not endorse any treatments or medications as safe, effective, or approved for treating a specific patient. UpToDate, Inc. and its affiliates disclaim any warranty or liability relating to this information or the use thereof.The use of this information is governed by the Terms of Use, available at https://www.[employer].com/en/know/clinical-effectiveness-terms. 2024© UpToDate, Inc. and its affiliates and/or licensors. All rights reserved.  Copyright   © 2024 UpToDate, Inc. and/or its affiliates. All rights reserved.

## 2024-10-31 NOTE — PROGRESS NOTES
Ambulatory Visit  Name: Naresh Mathew Jr.      : 1970      MRN: [id number]  Encounter Provider: Bahman Ordaz DO  Encounter Date: 10/31/2024   Encounter department: [facility]    Assessment & Plan  Primary hypertension  Blood pressure appears very well-controlled today.  Continue at this time with his current treatment of metoprolol, losartan as well as hydrochlorothiazide.  Orders:    losartan-hydrochlorothiazide (HYZAAR) 100-12.5 MG per tablet; Take 1 tablet by mouth daily    Hypertriglyceridemia  Unclear reason control.  Recheck lipid panel.  Continue with a strict low-fat/low-cholesterol as well as current treatment with fenofibrate.  Orders:    Comprehensive metabolic panel; Future    Lipid Panel with Direct LDL reflex; Future    Mass of joint of left shoulder  Reviewed patient symptoms today.  At some, is unclear if she is at because of his joint mass.  This was traumatic over the summer when he fell.  At this time, he does not have any pain or loss of function.  Will continue with routine monitoring.  If he would like to proceed with further evaluation, we will check MRI of his left shoulder joint.       Need for COVID-19 vaccine    Orders:    COVID-19 Pfizer mRNA vaccine 12 yr and older (Comirnaty pre-filled syringe)    Encounter for immunization    Orders:    influenza vaccine, recombinant, PF, 0.5 mL IM (Flublok)    Screening for thyroid disorder    Orders:    TSH, 3rd generation with Free T4 reflex; Future    Screening for diabetes mellitus    Orders:    Hemoglobin A1C; Future    Essential hypertension    Orders:    metoprolol succinate (TOPROL-XL) 50 mg 24 hr tablet; Take 1 tablet (50 mg total) by mouth daily    Annual physical exam            History of Present Illness     HPI  Patient is a 53-year-old male presents today for a follow-up on his chronic conditions.  He has hypertension as well as hypertriglyceridemia.  He also has Crohn's disease.  He has been taking his  medications regularly.  He denies adverse reactions with his medications.    History obtained from : patient  Review of Systems   Constitutional:  Negative for activity change, chills, fatigue and fever.   HENT:  Negative for congestion, ear pain, sinus pressure and sore throat.    Eyes:  Negative for redness, itching and visual disturbance.   Respiratory:  Negative for cough and shortness of breath.    Cardiovascular:  Negative for chest pain and palpitations.   Gastrointestinal:  Negative for abdominal pain, diarrhea and nausea.   Endocrine: Negative for cold intolerance and heat intolerance.   Genitourinary:  Negative for dysuria, flank pain and frequency.   Musculoskeletal:  Negative for arthralgias, back pain, gait problem and myalgias.   Skin:  Negative for color change.   Allergic/Immunologic: Negative for environmental allergies.   Neurological:  Negative for dizziness, numbness and headaches.   Psychiatric/Behavioral:  Negative for behavioral problems and sleep disturbance.      Medical History Reviewed by provider this encounter:       Current Outpatient Medications on File Prior to Visit   Medication Sig Dispense Refill    esomeprazole (NexIUM) 20 mg capsule Take 1 capsule (20 mg total) by mouth daily in the early morning 90 capsule 2    famotidine (PEPCID) 20 mg tablet TAKE 1 TABLET (20 MG TOTAL) BY MOUTH DAILY AT BEDTIME AS NEEDED FOR HEARTBURN 90 tablet 1    fenofibrate (TRICOR) 54 MG tablet TAKE 1 TABLET BY MOUTH EVERY DAY 90 tablet 0    inFLIXimab (REMICADE) 100 mg 440 mg IV infusion every 8 weeks 5 each 3    [DISCONTINUED] losartan-hydrochlorothiazide (HYZAAR) 100-12.5 MG per tablet TAKE 1 TABLET BY MOUTH EVERY DAY 90 tablet 1    [DISCONTINUED] metoprolol succinate (TOPROL-XL) 50 mg 24 hr tablet TAKE 1 TABLET BY MOUTH EVERY DAY 90 tablet 1     No current facility-administered medications on file prior to visit.      Social History     Tobacco Use    Smoking status: Former     Current packs/day: 0.00  "    Types: Cigarettes     Start date:      Quit date:      Years since quittin.8    Smokeless tobacco: Never   Vaping Use    Vaping status: Never Used   Substance and Sexual Activity    Alcohol use: Yes     Alcohol/week: 0.0 standard drinks of alcohol     Comment: occas    Drug use: No    Sexual activity: Yes     Partners: Female     Birth control/protection: Ring         Objective     /80 (BP Location: Left arm, Patient Position: Sitting, Cuff Size: Adult)   Pulse 60   Temp (!) 97.3 °F (36.3 °C) (Temporal)   Ht 5' 9\" (1.753 m)   Wt 100 kg (220 lb 9.6 oz)   SpO2 97%   BMI 32.58 kg/m²     Physical Exam  Vitals reviewed.   Constitutional:       General: He is not in acute distress.     Appearance: Normal appearance. He is well-developed.   HENT:      Head: Normocephalic and atraumatic.      Right Ear: Tympanic membrane, ear canal and external ear normal. There is no impacted cerumen.      Left Ear: Tympanic membrane, ear canal and external ear normal. There is no impacted cerumen.      Nose: Nose normal. No congestion or rhinorrhea.      Mouth/Throat:      Mouth: Mucous membranes are moist.      Pharynx: No oropharyngeal exudate or posterior oropharyngeal erythema.   Eyes:      General: No scleral icterus.        Right eye: No discharge.         Left eye: No discharge.      Extraocular Movements: Extraocular movements intact.      Conjunctiva/sclera: Conjunctivae normal.      Pupils: Pupils are equal, round, and reactive to light.   Neck:      Trachea: No tracheal deviation.   Cardiovascular:      Rate and Rhythm: Normal rate and regular rhythm.      Pulses: Normal pulses.           Dorsalis pedis pulses are 2+ on the right side and 2+ on the left side.        Posterior tibial pulses are 2+ on the right side and 2+ on the left side.      Heart sounds: Normal heart sounds. No murmur heard.     No friction rub. No gallop.   Pulmonary:      Effort: Pulmonary effort is normal. No respiratory " distress.      Breath sounds: Normal breath sounds. No wheezing, rhonchi or rales.   Abdominal:      General: Bowel sounds are normal. There is no distension.      Palpations: Abdomen is soft.      Tenderness: There is no abdominal tenderness. There is no guarding or rebound.   Musculoskeletal:         General: Normal range of motion.      Cervical back: Normal range of motion and neck supple.      Right lower leg: No edema.      Left lower leg: No edema.   Lymphadenopathy:      Head:      Right side of head: No submental or submandibular adenopathy.      Left side of head: No submental or submandibular adenopathy.      Cervical: No cervical adenopathy.      Right cervical: No superficial, deep or posterior cervical adenopathy.     Left cervical: No superficial, deep or posterior cervical adenopathy.   Skin:     General: Skin is warm and dry.      Findings: No erythema.   Neurological:      General: No focal deficit present.      Mental Status: He is alert and oriented to person, place, and time.      Cranial Nerves: No cranial nerve deficit.      Sensory: Sensation is intact. No sensory deficit.      Motor: Motor function is intact.   Psychiatric:         Attention and Perception: Attention and perception normal.         Mood and Affect: Mood is not anxious or depressed.         Speech: Speech normal.         Behavior: Behavior normal.         Thought Content: Thought content normal.         Judgment: Judgment normal.

## 2024-11-24 ENCOUNTER — TELEPHONE (OUTPATIENT)
Dept: OTHER | Facility: OTHER | Age: 54
End: 2024-11-24

## 2024-11-24 NOTE — TELEPHONE ENCOUNTER
Patient states he is having a flare up and would like to make an appt for Monday. He declined nurse triage at this time. Please call him back Monday morning. Thank you,

## 2024-11-25 ENCOUNTER — NURSE TRIAGE (OUTPATIENT)
Age: 54
End: 2024-11-25

## 2024-11-25 DIAGNOSIS — K50.919 CROHN'S DISEASE WITH COMPLICATION, UNSPECIFIED GASTROINTESTINAL TRACT LOCATION (HCC): Primary | ICD-10-CM

## 2024-11-25 NOTE — TELEPHONE ENCOUNTER
If patient is not passing any bowel movement or gas he needs to go to the emergency room for abdominal imaging.  I would not feel comfortable performing this as an outpatient and he should go now.  He would not be able to get steroids until we figure out what is going on.  Please further tell patient that my recommendations would be to go to the ER

## 2024-11-25 NOTE — TELEPHONE ENCOUNTER
"LOV 4/24/24 SHIVAM Rachel (ileocolonic Crohn's disease, GERD), Procedure colon 4/23/21, Remicade infusion q 8 weeks, pepcid 20 mg bedtime as needed, Nexium 20 mg morning    Patient called in with issues of not being able to pass stool, feels as if possible blockage (describes as \"inflammation end of GI tract). No BM in pasts three days.    He does note some BRB with sitting and straining trying to pass stool. He is hydrating, not eating due to current issue. He states he has been in remission for a while but last time he experienced similar steroids helped. Patient also notes not really passing gas.     I reviewed recommendation is to report to ED but patient declines at this time and would prefer provider input. Please review/advise.    Reason for Disposition   The patient is unable to pass stool and gas.    Answer Assessment - Initial Assessment Questions  1. ABDOMINAL PAIN: \"Are you having any abdominal pain?\" If yes, ask: \"What does it feel like?\" (e.g., crampy, dull, intermittent, constant)   Yes, feels like inflammation end of GI tract, no BM in three days last time was regular stool  2. ABDOMINAL PAIN SEVERITY: If present, ask: \"How bad is the pain?\"  (e.g., Scale 1-10; mild, moderate, or severe)      - MILD (1-3): doesn't interfere with normal activities, abdomen soft and not tender to touch     - MODERATE (4-7): interferes with normal activities or awakens from sleep, tender to touch     - SEVERE (8-10): excruciating pain, doubled over, unable to do any normal activities   moderate  3. RECURRENT SYMPTOM: \"Have you ever had this type of stomach pain before?\" If yes, ask: \"When was the last time?\" and \"What happened that time?\"   Yes with hx of Crohn's  4. AGGRAVATING FACTORS: \"Does anything seem to cause this pain to worsen?\" (e.g., foods, stress, alcohol)   Not currently  5. DIARRHEA SEVERITY: \"How bad is the diarrhea?\" \"How many extra stools have you had in the past 24 hours than normal?\"     - NO DIARRHEA " "(SCALE 0)     - MILD (SCALE 1-3): Few loose or mushy BMs (Bowel Movement); increase of 1-3 stools over normal daily number of stools; mild increase in ostomy output.     - MODERATE (SCALE 4-7): Increase of 4-6 stools daily over normal; moderate increase in ostomy output.    - SEVERE (SCALE 8-10; OR 'WORST POSSIBLE'): Increase of seven or more stools daily over normal; moderate increase in ostomy output; incontinence.   No diarrhea  6. ONSET: \"When did the diarrhea begin? Are you having urgency?\"   N/A  7. BM (Bowel Movement) CONSISTENCY: \"How loose or watery is the diarrhea?\" Do you see any blood or mucus in your stool and if so, how much are you seeing?   Not passing stool but has noticed BRB with straining on toilet paper and with stool he has passed  8. Do you have a history of hemorrhoids or anal fissures?   N/A  8. VOMITING: \"Are you also vomiting?\" If yes, ask: \"How many times in the past 24 hours?\"   denies  9. ORAL INTAKE: If vomiting, \"Have you been able to drink liquids?\" \"How much fluids have you had in the past 24 hours?\"   hydration  10. HYDRATION: \"Any signs of dehydration?\" (e.g., dry mouth [not just dry lips], too weak to stand, dizziness, new weight loss) \"When did you last urinate?\"   no  11. What IBD (biologic) medications are you taking and are you up to date with the past three doses?   Remicade q 8 weeks,  12. Have you taken a steroid taper? If so, when was your last?   Not for a long time has   13. Have you had any recent travel, sick contacts, or recent hospitalizations?   Travels for work was in [place] recently  14. Do you have any fever, chills, sweat, joint pain, rashes?   denies  15. Do you have any other concerns?   no    Protocols used: GI-IBD (Irrirtable Bowel Disease)-ADULT-[place]    "

## 2024-11-25 NOTE — TELEPHONE ENCOUNTER
Regarding: Crohns flare up/ no BM/discomfort  ----- Message from Yari CMNEIL sent at 11/25/2024  9:00 AM EST -----  Pt called in hoping to see someone today as he is having a Crohn's flare up. Pt unable to have a BM and has a lot of discomfort. Placed pt into soonest slot in January and added him to the wait list.  Pt would also like to see if a steroid can be sent over for him.

## 2024-11-25 NOTE — TELEPHONE ENCOUNTER
Spoke with pt, he had small bm just now and feels things are calming down. Pt declining ER at this time.

## 2024-11-25 NOTE — TELEPHONE ENCOUNTER
Patient should monitor closely.  If he feels as though his abdomen is distended or develops any nausea, vomiting or stops passing any gas or bowel movements I recommend ER.  In the meantime I ordered a CT enterography to evaluate for any active disease.  This should be performed ideally in the next few weeks this.  Will be used to evaluate for any active disease and does not take away from the fact that he should go to the emergency room if any development of alarm symptoms as listed above. He can start Miralax 1 capful daily to help with softening bowel movements.

## 2024-12-04 DIAGNOSIS — K50.819 CROHN'S DISEASE OF SMALL AND LARGE INTESTINES WITH COMPLICATION (HCC): Primary | ICD-10-CM

## 2024-12-09 ENCOUNTER — TELEPHONE (OUTPATIENT)
Age: 54
End: 2024-12-09

## 2024-12-09 NOTE — TELEPHONE ENCOUNTER
Patients GI provider: EDIL Rachel    Number to return call: [phone]    Reason for call: Yanci from [facility] called pt needs prescription renewal for remicade. Fax number [phone]    Scheduled procedure/appointment date if applicable: 1/17/2025

## 2024-12-11 ENCOUNTER — RESULTS FOLLOW-UP (OUTPATIENT)
Dept: FAMILY MEDICINE CLINIC | Facility: CLINIC | Age: 54
End: 2024-12-11

## 2024-12-11 ENCOUNTER — APPOINTMENT (OUTPATIENT)
Dept: LAB | Facility: CLINIC | Age: 54
End: 2024-12-11
Payer: COMMERCIAL

## 2024-12-11 DIAGNOSIS — Z13.1 SCREENING FOR DIABETES MELLITUS: ICD-10-CM

## 2024-12-11 DIAGNOSIS — K50.919 CROHN'S DISEASE WITH COMPLICATION, UNSPECIFIED GASTROINTESTINAL TRACT LOCATION (HCC): ICD-10-CM

## 2024-12-11 DIAGNOSIS — K50.819 CROHN'S DISEASE OF SMALL AND LARGE INTESTINES WITH COMPLICATION (HCC): ICD-10-CM

## 2024-12-11 DIAGNOSIS — E78.1 HYPERTRIGLYCERIDEMIA: ICD-10-CM

## 2024-12-11 DIAGNOSIS — Z13.29 SCREENING FOR THYROID DISORDER: ICD-10-CM

## 2024-12-11 LAB
ALBUMIN SERPL BCG-MCNC: 4.1 G/DL (ref 3.5–5)
ALP SERPL-CCNC: 75 U/L (ref 34–104)
ALT SERPL W P-5'-P-CCNC: 39 U/L (ref 7–52)
ANION GAP SERPL CALCULATED.3IONS-SCNC: 7 MMOL/L (ref 4–13)
AST SERPL W P-5'-P-CCNC: 26 U/L (ref 13–39)
BILIRUB SERPL-MCNC: 0.77 MG/DL (ref 0.2–1)
BUN SERPL-MCNC: 14 MG/DL (ref 5–25)
CALCIUM SERPL-MCNC: 9.7 MG/DL (ref 8.4–10.2)
CHLORIDE SERPL-SCNC: 107 MMOL/L (ref 96–108)
CHOLEST SERPL-MCNC: 159 MG/DL (ref ?–200)
CO2 SERPL-SCNC: 28 MMOL/L (ref 21–32)
CREAT SERPL-MCNC: 1.01 MG/DL (ref 0.6–1.3)
EST. AVERAGE GLUCOSE BLD GHB EST-MCNC: 97 MG/DL
GFR SERPL CREATININE-BSD FRML MDRD: 83 ML/MIN/1.73SQ M
GLUCOSE P FAST SERPL-MCNC: 89 MG/DL (ref 65–99)
HBA1C MFR BLD: 5 %
HDLC SERPL-MCNC: 35 MG/DL
LDLC SERPL CALC-MCNC: 68 MG/DL (ref 0–100)
POTASSIUM SERPL-SCNC: 3.9 MMOL/L (ref 3.5–5.3)
PROT SERPL-MCNC: 7 G/DL (ref 6.4–8.4)
SODIUM SERPL-SCNC: 142 MMOL/L (ref 135–147)
TRIGL SERPL-MCNC: 278 MG/DL (ref ?–150)
TSH SERPL DL<=0.05 MIU/L-ACNC: 4.44 UIU/ML (ref 0.45–4.5)

## 2024-12-11 PROCEDURE — 83036 HEMOGLOBIN GLYCOSYLATED A1C: CPT

## 2024-12-11 PROCEDURE — 36415 COLL VENOUS BLD VENIPUNCTURE: CPT

## 2024-12-11 PROCEDURE — 80053 COMPREHEN METABOLIC PANEL: CPT

## 2024-12-11 PROCEDURE — 80061 LIPID PANEL: CPT

## 2024-12-11 PROCEDURE — 84443 ASSAY THYROID STIM HORMONE: CPT

## 2024-12-11 RX ORDER — INFLIXIMAB 100 MG/10ML
INJECTION, POWDER, LYOPHILIZED, FOR SOLUTION INTRAVENOUS
Qty: 5 EACH | Refills: 3 | Status: SHIPPED | OUTPATIENT
Start: 2024-12-11

## 2024-12-11 NOTE — TELEPHONE ENCOUNTER
Spoke with Demetrius. She mentioned pt has been receiving 440mg every 8 weeks. With his current weight the 5mg/kg dose would be 500mg. Would you like to keep pt on dose of 440mg he has been receiving, or increase to the true 5mg/kg dose of 500mg?

## 2024-12-14 ENCOUNTER — HOSPITAL ENCOUNTER (OUTPATIENT)
Dept: CT IMAGING | Facility: HOSPITAL | Age: 54
Discharge: HOME | End: 2024-12-14
Payer: COMMERCIAL

## 2024-12-14 DIAGNOSIS — K50.919 CROHN'S DISEASE WITH COMPLICATION, UNSPECIFIED GASTROINTESTINAL TRACT LOCATION (HCC): ICD-10-CM

## 2024-12-14 PROCEDURE — 74177 CT ABD & PELVIS W/CONTRAST: CPT

## 2024-12-14 RX ADMIN — IOHEXOL 100 ML: 350 INJECTION, SOLUTION INTRAVENOUS at 10:09

## 2024-12-17 ENCOUNTER — RESULTS FOLLOW-UP (OUTPATIENT)
Dept: GASTROENTEROLOGY | Facility: AMBULARY SURGERY CENTER | Age: 54
End: 2024-12-17

## 2024-12-17 DIAGNOSIS — K50.919 CROHN'S DISEASE WITH COMPLICATION, UNSPECIFIED GASTROINTESTINAL TRACT LOCATION (HCC): Primary | ICD-10-CM

## 2024-12-17 NOTE — RESULT ENCOUNTER NOTE
Called spoke to patient.  History of Crohn's disease on infliximab every 8 weeks.  He was previously doing well 4/2024 with normal fecal calprotectin.  Called in reporting some worsening diarrhea over the past few weeks.  CT enterography showing mild bowel wall thickening involving majority of the colon, worse in the sigmoid.  He reports around 8 bowel movements a day.  No blood.  No abdominal pain.  No fevers or chills.  He is currently in [place] and will be back on Friday.    Recommend obtaining stool studies, fecal calprotectin, CBC, CMP.  Will check vitamin D and iron panel due to fatigue as well.  If stool studies come back negative for infection we will send course of steroids for flare early next week.    He had previous infliximab level which was low with small amount of antibodies as well.  I previously discussed with his Dr. Price and since he was asymptomatic at that time we recommended monitoring.  At this time due to flare will plan to increase infliximab to every 6 weeks.  Patient agreeable.  Last colonoscopy in 2021.  Likely will schedule repeat at next office visit next month.    Advised patient to call back with any questions or concerns.

## 2024-12-20 ENCOUNTER — APPOINTMENT (OUTPATIENT)
Dept: LAB | Facility: CLINIC | Age: 54
End: 2024-12-20
Payer: COMMERCIAL

## 2024-12-20 DIAGNOSIS — K50.919 CROHN'S DISEASE WITH COMPLICATION, UNSPECIFIED GASTROINTESTINAL TRACT LOCATION (HCC): ICD-10-CM

## 2024-12-20 LAB
25(OH)D3 SERPL-MCNC: 17.7 NG/ML (ref 30–100)
ALBUMIN SERPL BCG-MCNC: 4.1 G/DL (ref 3.5–5)
ALP SERPL-CCNC: 62 U/L (ref 34–104)
ALT SERPL W P-5'-P-CCNC: 38 U/L (ref 7–52)
ANION GAP SERPL CALCULATED.3IONS-SCNC: 6 MMOL/L (ref 4–13)
AST SERPL W P-5'-P-CCNC: 26 U/L (ref 13–39)
BASOPHILS # BLD AUTO: 0.03 THOUSANDS/ΜL (ref 0–0.1)
BASOPHILS NFR BLD AUTO: 1 % (ref 0–1)
BILIRUB SERPL-MCNC: 0.67 MG/DL (ref 0.2–1)
BUN SERPL-MCNC: 17 MG/DL (ref 5–25)
CALCIUM SERPL-MCNC: 9.9 MG/DL (ref 8.4–10.2)
CHLORIDE SERPL-SCNC: 106 MMOL/L (ref 96–108)
CO2 SERPL-SCNC: 28 MMOL/L (ref 21–32)
CREAT SERPL-MCNC: 1 MG/DL (ref 0.6–1.3)
EOSINOPHIL # BLD AUTO: 0.18 THOUSAND/ΜL (ref 0–0.61)
EOSINOPHIL NFR BLD AUTO: 3 % (ref 0–6)
ERYTHROCYTE [DISTWIDTH] IN BLOOD BY AUTOMATED COUNT: 12.6 % (ref 11.6–15.1)
FERRITIN SERPL-MCNC: 51 NG/ML (ref 24–336)
GFR SERPL CREATININE-BSD FRML MDRD: 84 ML/MIN/1.73SQ M
GLUCOSE P FAST SERPL-MCNC: 96 MG/DL (ref 65–99)
HCT VFR BLD AUTO: 44 % (ref 36.5–49.3)
HGB BLD-MCNC: 15.2 G/DL (ref 12–17)
IMM GRANULOCYTES # BLD AUTO: 0.02 THOUSAND/UL (ref 0–0.2)
IMM GRANULOCYTES NFR BLD AUTO: 0 % (ref 0–2)
IRON SATN MFR SERPL: 26 % (ref 15–50)
IRON SERPL-MCNC: 97 UG/DL (ref 50–212)
LYMPHOCYTES # BLD AUTO: 2.46 THOUSANDS/ΜL (ref 0.6–4.47)
LYMPHOCYTES NFR BLD AUTO: 39 % (ref 14–44)
MCH RBC QN AUTO: 32.3 PG (ref 26.8–34.3)
MCHC RBC AUTO-ENTMCNC: 34.5 G/DL (ref 31.4–37.4)
MCV RBC AUTO: 94 FL (ref 82–98)
MONOCYTES # BLD AUTO: 0.56 THOUSAND/ΜL (ref 0.17–1.22)
MONOCYTES NFR BLD AUTO: 9 % (ref 4–12)
NEUTROPHILS # BLD AUTO: 2.99 THOUSANDS/ΜL (ref 1.85–7.62)
NEUTS SEG NFR BLD AUTO: 48 % (ref 43–75)
NRBC BLD AUTO-RTO: 0 /100 WBCS
PLATELET # BLD AUTO: 177 THOUSANDS/UL (ref 149–390)
PMV BLD AUTO: 12.2 FL (ref 8.9–12.7)
POTASSIUM SERPL-SCNC: 3.4 MMOL/L (ref 3.5–5.3)
PROT SERPL-MCNC: 6.7 G/DL (ref 6.4–8.4)
RBC # BLD AUTO: 4.7 MILLION/UL (ref 3.88–5.62)
SODIUM SERPL-SCNC: 140 MMOL/L (ref 135–147)
TIBC SERPL-MCNC: 372.4 UG/DL (ref 250–450)
TRANSFERRIN SERPL-MCNC: 266 MG/DL (ref 203–362)
UIBC SERPL-MCNC: 275 UG/DL (ref 155–355)
WBC # BLD AUTO: 6.24 THOUSAND/UL (ref 4.31–10.16)

## 2024-12-20 PROCEDURE — 83540 ASSAY OF IRON: CPT

## 2024-12-20 PROCEDURE — 36415 COLL VENOUS BLD VENIPUNCTURE: CPT

## 2024-12-20 PROCEDURE — 85025 COMPLETE CBC W/AUTO DIFF WBC: CPT

## 2024-12-20 PROCEDURE — 82728 ASSAY OF FERRITIN: CPT

## 2024-12-20 PROCEDURE — 80053 COMPREHEN METABOLIC PANEL: CPT

## 2024-12-20 PROCEDURE — 82306 VITAMIN D 25 HYDROXY: CPT

## 2024-12-20 PROCEDURE — 83550 IRON BINDING TEST: CPT

## 2024-12-21 ENCOUNTER — APPOINTMENT (OUTPATIENT)
Dept: LAB | Facility: CLINIC | Age: 54
End: 2024-12-21
Payer: COMMERCIAL

## 2024-12-21 DIAGNOSIS — K50.919 CROHN'S DISEASE WITH COMPLICATION, UNSPECIFIED GASTROINTESTINAL TRACT LOCATION (HCC): ICD-10-CM

## 2024-12-21 PROCEDURE — 83993 ASSAY FOR CALPROTECTIN FECAL: CPT

## 2024-12-21 PROCEDURE — 87505 NFCT AGENT DETECTION GI: CPT

## 2024-12-22 LAB — C DIFF TOX GENS STL QL NAA+PROBE: NEGATIVE

## 2024-12-23 ENCOUNTER — PREP FOR PROCEDURE (OUTPATIENT)
Dept: GASTROENTEROLOGY | Facility: AMBULARY SURGERY CENTER | Age: 54
End: 2024-12-23

## 2024-12-23 ENCOUNTER — RESULTS FOLLOW-UP (OUTPATIENT)
Dept: GASTROENTEROLOGY | Facility: CLINIC | Age: 54
End: 2024-12-23

## 2024-12-23 DIAGNOSIS — K50.919 CROHN'S DISEASE WITH COMPLICATION, UNSPECIFIED GASTROINTESTINAL TRACT LOCATION (HCC): Primary | ICD-10-CM

## 2024-12-23 LAB
C COLI+JEJUNI TUF STL QL NAA+PROBE: NEGATIVE
CALPROTECTIN STL-MCNC: 19.9 ÂΜG/G
EC STX1+STX2 GENES STL QL NAA+PROBE: NEGATIVE
SALMONELLA SP SPAO STL QL NAA+PROBE: NEGATIVE
SHIGELLA SP+EIEC IPAH STL QL NAA+PROBE: NEGATIVE

## 2024-12-23 RX ORDER — SODIUM CHLORIDE, SODIUM LACTATE, POTASSIUM CHLORIDE, CALCIUM CHLORIDE 600; 310; 30; 20 MG/100ML; MG/100ML; MG/100ML; MG/100ML
125 INJECTION, SOLUTION INTRAVENOUS CONTINUOUS
OUTPATIENT
Start: 2024-12-23

## 2024-12-23 NOTE — TELEPHONE ENCOUNTER
Patients GI provider:  Chrissy      Number to return call: [phone]    Reason for call: Patient called in stating all blood work has been completed and is requesting steroids for flare-up. Please reach out to patient, thank you.    Scheduled procedure/appointment date if applicable: Apt/procedure 1/17/2025

## 2024-12-23 NOTE — RESULT ENCOUNTER NOTE
I called and spoke to patient.  Very interestingly, his fecal calprotectin is completely normal.  He had a recent MRI recently that showed active inflammation of his Crohns.  He reports over the past 2 days his bowel movements are now improved only 2-3 times a day, loose which is his baseline.  He reports he is feeling better.  I am unsure if this is truly due to active disease or if this was some infectious process.  He is okay with holding off on steroids at this time.  I think would actually be better for him to have a colonoscopy to evaluate as he has some conflicting information on his CT scan and stool test.  He is agreeable to this.  He also just had his Remicade increased to every 6-week because of his drug level/antibodies.    Clerical - Please call patient to schedule colonoscopy with Dr. Price with [facility]. Thank you    Dr Dee DE ANDA, let me know if any other recs

## 2025-01-02 DIAGNOSIS — K50.819 CROHN'S DISEASE OF SMALL AND LARGE INTESTINES WITH COMPLICATION (HCC): Primary | ICD-10-CM

## 2025-01-02 NOTE — TELEPHONE ENCOUNTER
----- Message from Cari CROFT sent at 1/2/2025  9:39 AM EST -----    ----- Message -----  From: Rosalee Rachel PA-C  Sent: 12/23/2024   4:25 PM EST  To: Olu Price MD; #    I called and spoke to patient.  Very interestingly, his fecal calprotectin is completely normal.  He had a recent MRI recently that showed active inflammation of his Crohns.  He reports over the past 2 days his bowel movements are now improved only 2-3 times a day, loose which is his baseline.  He reports he is feeling better.  I am unsure if this is truly due to active disease or if this was some infectious process.  He is okay with holding off on steroids at this time.  I think would actually be better for him to have a colonoscopy to evaluate as he has some conflicting information on his CT scan and stool test.  He is agreeable to this.  He also just had his Remicade increased to every 6-week because of his drug level/antibodies.    Clerical - Please call patient to schedule colonoscopy with Dr. Price with [employer] prep. Thank you    Dr Dee DE ANDA, let me know if any other recs

## 2025-01-02 NOTE — TELEPHONE ENCOUNTER
Scheduled date of colonoscopy (as of today):01/16/25  Physician performing colonoscopy:Dr. Price  Location of colonoscopy:[facility]  Bowel prep reviewed with patient:last  Instructions reviewed with patient by:rema  Clearances: n/a

## 2025-01-15 ENCOUNTER — TELEPHONE (OUTPATIENT)
Age: 55
End: 2025-01-15

## 2025-01-15 NOTE — TELEPHONE ENCOUNTER
Patient currently being infused with [facility]. He would like to switch home infusion agencies.     Confirmed [facility] is in network and patient agreeable to change.     Due for next dose 2/3/2025

## 2025-01-16 ENCOUNTER — ANESTHESIA (OUTPATIENT)
Dept: GASTROENTEROLOGY | Facility: AMBULARY SURGERY CENTER | Age: 55
End: 2025-01-16
Payer: COMMERCIAL

## 2025-01-16 ENCOUNTER — HOSPITAL ENCOUNTER (OUTPATIENT)
Dept: GASTROENTEROLOGY | Facility: AMBULARY SURGERY CENTER | Age: 55
Setting detail: OUTPATIENT SURGERY
End: 2025-01-16
Payer: COMMERCIAL

## 2025-01-16 VITALS
BODY MASS INDEX: 31.1 KG/M2 | RESPIRATION RATE: 16 BRPM | TEMPERATURE: 97.8 F | WEIGHT: 210 LBS | HEART RATE: 58 BPM | SYSTOLIC BLOOD PRESSURE: 130 MMHG | DIASTOLIC BLOOD PRESSURE: 85 MMHG | OXYGEN SATURATION: 100 % | HEIGHT: 69 IN

## 2025-01-16 DIAGNOSIS — K50.919 CROHN'S DISEASE WITH COMPLICATION, UNSPECIFIED GASTROINTESTINAL TRACT LOCATION (HCC): ICD-10-CM

## 2025-01-16 PROCEDURE — 88305 TISSUE EXAM BY PATHOLOGIST: CPT | Performed by: STUDENT IN AN ORGANIZED HEALTH CARE EDUCATION/TRAINING PROGRAM

## 2025-01-16 PROCEDURE — 45380 COLONOSCOPY AND BIOPSY: CPT | Performed by: INTERNAL MEDICINE

## 2025-01-16 RX ORDER — LIDOCAINE HYDROCHLORIDE 10 MG/ML
INJECTION, SOLUTION EPIDURAL; INFILTRATION; INTRACAUDAL; PERINEURAL AS NEEDED
Status: DISCONTINUED | OUTPATIENT
Start: 2025-01-16 | End: 2025-01-16

## 2025-01-16 RX ORDER — PROPOFOL 10 MG/ML
INJECTION, EMULSION INTRAVENOUS AS NEEDED
Status: DISCONTINUED | OUTPATIENT
Start: 2025-01-16 | End: 2025-01-16

## 2025-01-16 RX ORDER — SODIUM CHLORIDE, SODIUM LACTATE, POTASSIUM CHLORIDE, CALCIUM CHLORIDE 600; 310; 30; 20 MG/100ML; MG/100ML; MG/100ML; MG/100ML
125 INJECTION, SOLUTION INTRAVENOUS CONTINUOUS
Status: DISCONTINUED | OUTPATIENT
Start: 2025-01-16 | End: 2025-01-20 | Stop reason: HOSPADM

## 2025-01-16 RX ADMIN — PROPOFOL 150 MG: 10 INJECTION, EMULSION INTRAVENOUS at 15:30

## 2025-01-16 RX ADMIN — PROPOFOL 50 MG: 10 INJECTION, EMULSION INTRAVENOUS at 15:40

## 2025-01-16 RX ADMIN — PROPOFOL 50 MG: 10 INJECTION, EMULSION INTRAVENOUS at 15:36

## 2025-01-16 RX ADMIN — PROPOFOL 50 MG: 10 INJECTION, EMULSION INTRAVENOUS at 15:31

## 2025-01-16 RX ADMIN — PROPOFOL 50 MG: 10 INJECTION, EMULSION INTRAVENOUS at 15:33

## 2025-01-16 RX ADMIN — LIDOCAINE HYDROCHLORIDE 50 MG: 10 INJECTION, SOLUTION EPIDURAL; INFILTRATION; INTRACAUDAL at 15:30

## 2025-01-16 RX ADMIN — SODIUM CHLORIDE, SODIUM LACTATE, POTASSIUM CHLORIDE, AND CALCIUM CHLORIDE: .6; .31; .03; .02 INJECTION, SOLUTION INTRAVENOUS at 15:15

## 2025-01-16 NOTE — ANESTHESIA PREPROCEDURE EVALUATION
Procedure:  COLONOSCOPY    Relevant Problems   ANESTHESIA (within normal limits)      CARDIO   (+) Hypercholesteremia   (+) Hypertension      ENDO (within normal limits)      GI/HEPATIC   (+) GERD (gastroesophageal reflux disease)   (+) Gastroesophageal reflux disease without esophagitis      /RENAL (within normal limits)      GYN (within normal limits)      HEMATOLOGY (within normal limits)      MUSCULOSKELETAL (within normal limits)      NEURO/PSYCH (within normal limits)      PULMONARY (within normal limits)      FEN/Gastrointestinal   (+) Crohn's disease of both small and large intestine without complication (HCC)      Other   (+) Crohn's disease (HCC)   (+) Splenomegaly        Physical Exam    Airway    Mallampati score: II  TM Distance: >3 FB  Neck ROM: full     Dental   No notable dental hx     Cardiovascular  Rhythm: regular, Rate: normal, Cardiovascular exam normal    Pulmonary  Pulmonary exam normal Breath sounds clear to auscultation    Other Findings        Anesthesia Plan  ASA Score- 3     Anesthesia Type- IV sedation with anesthesia with ASA Monitors.         Additional Monitors:     Airway Plan:            Plan Factors-Exercise tolerance (METS): >4 METS.    Chart reviewed.   Existing labs reviewed. Patient summary reviewed.          Obstructive sleep apnea risk education given perioperatively.        Induction- intravenous.    Postoperative Plan-     Perioperative Resuscitation Plan - Level 1 - Full Code.       Informed Consent- Anesthetic plan and risks discussed with patient.  I personally reviewed this patient with the CRNA. Discussed and agreed on the Anesthesia Plan with the CRNA..      NPO Status:  Vitals Value Taken Time   Date of last liquid 01/16/25 01/16/25 1406   Time of last liquid 1000 01/16/25 1406   Date of last solid 01/14/25 01/16/25 1406   Time of last solid 1900 01/16/25 1406

## 2025-01-16 NOTE — ANESTHESIA POSTPROCEDURE EVALUATION
Post-Op Assessment Note    CV Status:  Stable  Pain Score: 0    Pain management: adequate       Mental Status:  Arousable and sleepy   Hydration Status:  Euvolemic   PONV Controlled:  Controlled   Airway Patency:  Patent     Post Op Vitals Reviewed: Yes    No anethesia notable event occurred.    Staff: CRNA, Anesthesiologist         Vitals:    01/16/25 1610   BP: 130/85   Pulse: 58   Resp: 16   Temp: 97.8 °F (36.6 °C)   SpO2: 100%

## 2025-01-16 NOTE — H&P
"History and Physical -  [facility]  Naresh Mathew Jr. 54 y.o. male MRN: [id number]    HPI: Naresh Mathew Jr. is a 54 y.o. year old male who presents with hx of crohns disease      Review of Systems    Historical Information   Past Medical History:   Diagnosis Date    Allergic     Anemia     Colon polyp     Crohn's disease (HCC)     GERD (gastroesophageal reflux disease)     Hypertension     Inflammatory bowel disease     Melanoma (HCC)      Past Surgical History:   Procedure Laterality Date    APPENDECTOMY      COLON SURGERY      x 2 bowel resections    COLONOSCOPY  2021    HAND SURGERY      [place] COLONOSCOPY FLX DX W/COLLJ SPEC WHEN PFRMD N/A 2018    Procedure: EGD AND COLONOSCOPY;  Surgeon: Olu Price MD;  Location: [facility];  Service: Gastroenterology     Social History   Social History     Substance and Sexual Activity   Alcohol Use Yes    Alcohol/week: 3.0 standard drinks of alcohol    Types: 3 Standard drinks or equivalent per week    Comment: occas     Social History     Substance and Sexual Activity   Drug Use Yes    Types: Marijuana    Comment: edible THC gummies     Social History     Tobacco Use   Smoking Status Former    Current packs/day: 0.00    Types: Cigarettes    Start date:     Quit date:     Years since quittin.0   Smokeless Tobacco Never     Family History   Problem Relation Age of Onset    Diabetes Mother     Cancer Father         colon    Hypertension Father     Crohn's disease Paternal Uncle        Meds/Allergies     Not in a hospital admission.    No Known Allergies    Objective     /83   Pulse 63   Temp (!) 97 °F (36.1 °C) (Temporal)   Resp 16   Ht 5' 9\" (1.753 m)   Wt 95.3 kg (210 lb)   SpO2 98%   BMI 31.01 kg/m²       PHYSICAL EXAM    Gen: NAD  CV: RRR  CHEST: Clear  ABD: soft, NT/ND  EXT: no edema  Neuro: AAO      ASSESSMENT/PLAN:  This is a 54 y.o. year old male here for hx of crohns disease      PLAN:   Procedure: " colonoscopy

## 2025-01-17 ENCOUNTER — OFFICE VISIT (OUTPATIENT)
Dept: GASTROENTEROLOGY | Facility: AMBULARY SURGERY CENTER | Age: 55
End: 2025-01-17
Payer: COMMERCIAL

## 2025-01-17 VITALS
OXYGEN SATURATION: 97 % | HEART RATE: 61 BPM | SYSTOLIC BLOOD PRESSURE: 124 MMHG | BODY MASS INDEX: 31.81 KG/M2 | WEIGHT: 214.8 LBS | HEIGHT: 69 IN | DIASTOLIC BLOOD PRESSURE: 74 MMHG

## 2025-01-17 DIAGNOSIS — H53.8 BLURRY VISION: ICD-10-CM

## 2025-01-17 DIAGNOSIS — K21.9 GASTROESOPHAGEAL REFLUX DISEASE, UNSPECIFIED WHETHER ESOPHAGITIS PRESENT: ICD-10-CM

## 2025-01-17 DIAGNOSIS — K50.919 CROHN'S DISEASE WITH COMPLICATION, UNSPECIFIED GASTROINTESTINAL TRACT LOCATION (HCC): Primary | ICD-10-CM

## 2025-01-17 PROCEDURE — 99214 OFFICE O/P EST MOD 30 MIN: CPT | Performed by: PHYSICIAN ASSISTANT

## 2025-01-17 NOTE — ASSESSMENT & PLAN NOTE
Patient reports well-controlled symptoms on low-dose Nexium 20 mg and Pepcid as needed.    -Continue Nexium and Pepcid.  - Consider EGD in the future based on symptoms or if persistent chronic symptoms develop to rule out underlying chronic change

## 2025-01-17 NOTE — PROGRESS NOTES
Name: Naresh Mathew Jr.      : 1970      MRN: [id number]  Encounter Provider: Rosalee Rachel PA-C  Encounter Date: 2025   Encounter department: [facility]  :    54 year old male with history of ileocolonic Crohn's disease status post resection and primary anastomosis many years ago has been maintained on infliximab for several years who presents to the office for follow-up.   Assessment & Plan  Crohn's disease with complication, unspecified gastrointestinal tract location (HCC)  There was concern for possible flaring symptoms over the past few weeks.  However suspect this was due to an infection as this self resolved without the need for steroids.  Fecal calprotectin normal.  Colonoscopy yesterday looks relatively unremarkable aside from some mild inflammation of the sigmoid associated with diverticulosis.    -Suspect recent diarrheal symptoms were in the setting of infection.  His bowel movements may have changed from more of a postinfectious effect.  Will continue to monitor .  He reports ultimately he is feeling well at this time.    -Follow up biopsy results.  -Patient would like to avoid switching medication unless absolutely necessary which is reasonable.  Consider Humira as a next step if needed.    -Due to some low level of infliximab antibodies and low drug level, dose was increased to every 6 weeks.  The dosage was also increased slightly based on his weight.  -We will recheck infliximab drug level and antibody.  He is due for his next infusion around February 3 to .  We will check this the week prior to this.    -Referral to ophthalmology due to some change in vision.    -Continue to avoid NSAIDs and smoking.    -Patient will be due for TB testing and hepatitis testing around 2025  Blurry vision    Orders:    Ambulatory Referral to Ophthalmology; Future    Gastroesophageal reflux disease, unspecified whether esophagitis present  Patient reports  well-controlled symptoms on low-dose Nexium 20 mg and Pepcid as needed.    -Continue Nexium and Pepcid.  - Consider EGD in the future based on symptoms or if persistent chronic symptoms develop to rule out underlying chronic change         Recommend 6-month follow-up.  Patient will look at his schedule when he is home to schedule this online.  He will reach out with any questions, concerns or new or worsening symptoms in the meantime      History of Present Illness   HPI  Naresh Mathew Jr. is a 54 y.o. male who presents with longstanding history of ileocolonic Crohn's disease status post resection been maintained on Remicade for many years who presents the office for follow-up.    Patient had been communicating with us via [employer] over the last few weeks/months.  It appeared he was having flare of symptoms however these resolve relatively on their own without the need for steroids.  Fecal calprotectin was then obtained and was normal.  CT enterography at the time of symptoms 12/2024 showed active colitis throughout the colon.  Stool studies were negative for infection.    Due to inconsistent results, colonoscopy was performed yesterday 1/16/2024 was relatively unremarkable with normal, healthy appearing colon besides some inflammation in the sigmoid colon associated with some diverticulosis.  Biopsies were taken but are not resulted yet.    Patient reports doing relatively well.  He reports his baseline bowel movements are typically around 3 to 4-day however more recently has been 5-6 with more urgency.  However no blood in the stool.  This seemed to happen after the more abrupt diarrhea which I suspect was in the setting of an infectious cause.    He previously had infliximab level and antibody checked.  He has low-level antibodies and his drug level was also low.  He was doing well therefore  medication was stayed the same however due to some flare in symptoms he was decreased to every 6 weeks as well as dose being  "slightly adjusted/increased in the setting of his weight.    Reflux well-controlled on Nexium and Pepcid.    He does report some blurry vision recently.  Reports some joint pains which are manageable.          Review of Systems       Objective   /74 (BP Location: Left arm, Patient Position: Sitting)   Pulse 61   Ht 5' 9\" (1.753 m)   Wt 97.4 kg (214 lb 12.8 oz)   SpO2 97%   BMI 31.72 kg/m²      Physical Exam      " ACP

## 2025-01-17 NOTE — ASSESSMENT & PLAN NOTE
There was concern for possible flaring symptoms over the past few weeks.  However suspect this was due to an infection as this self resolved without the need for steroids.  Fecal calprotectin normal.  Colonoscopy yesterday looks relatively unremarkable aside from some mild inflammation of the sigmoid associated with diverticulosis.    -Suspect recent diarrheal symptoms were in the setting of infection.  His bowel movements may have changed from more of a postinfectious effect.  Will continue to monitor .  He reports ultimately he is feeling well at this time.    -Follow up biopsy results.  -Patient would like to avoid switching medication unless absolutely necessary which is reasonable.  Consider Humira as a next step if needed.    -Due to some low level of infliximab antibodies and low drug level, dose was increased to every 6 weeks.  The dosage was also increased slightly based on his weight.  -We will recheck infliximab drug level and antibody.  He is due for his next infusion around February 3 to 6.  We will check this the week prior to this.    -Referral to ophthalmology due to some change in vision.    -Continue to avoid NSAIDs and smoking.    -Patient will be due for TB testing and hepatitis testing around 5/2025

## 2025-01-20 PROCEDURE — 88305 TISSUE EXAM BY PATHOLOGIST: CPT | Performed by: STUDENT IN AN ORGANIZED HEALTH CARE EDUCATION/TRAINING PROGRAM

## 2025-01-27 ENCOUNTER — APPOINTMENT (OUTPATIENT)
Dept: LAB | Facility: CLINIC | Age: 55
End: 2025-01-27
Payer: COMMERCIAL

## 2025-01-27 ENCOUNTER — RESULTS FOLLOW-UP (OUTPATIENT)
Dept: GASTROENTEROLOGY | Facility: AMBULARY SURGERY CENTER | Age: 55
End: 2025-01-27

## 2025-01-27 DIAGNOSIS — K50.919 CROHN'S DISEASE WITH COMPLICATION, UNSPECIFIED GASTROINTESTINAL TRACT LOCATION (HCC): ICD-10-CM

## 2025-01-27 PROCEDURE — 82397 CHEMILUMINESCENT ASSAY: CPT

## 2025-01-27 PROCEDURE — 36415 COLL VENOUS BLD VENIPUNCTURE: CPT

## 2025-01-27 PROCEDURE — 80230 DRUG ASSAY INFLIXIMAB: CPT

## 2025-01-31 ENCOUNTER — OFFICE VISIT (OUTPATIENT)
Dept: FAMILY MEDICINE CLINIC | Facility: CLINIC | Age: 55
End: 2025-01-31
Payer: COMMERCIAL

## 2025-01-31 VITALS
HEIGHT: 69 IN | DIASTOLIC BLOOD PRESSURE: 84 MMHG | SYSTOLIC BLOOD PRESSURE: 130 MMHG | HEART RATE: 77 BPM | BODY MASS INDEX: 31.55 KG/M2 | WEIGHT: 213 LBS | TEMPERATURE: 97.1 F | OXYGEN SATURATION: 97 %

## 2025-01-31 DIAGNOSIS — J01.90 ACUTE NON-RECURRENT SINUSITIS, UNSPECIFIED LOCATION: Primary | ICD-10-CM

## 2025-01-31 PROCEDURE — 99214 OFFICE O/P EST MOD 30 MIN: CPT

## 2025-01-31 RX ORDER — FLUTICASONE PROPIONATE 50 MCG
1 SPRAY, SUSPENSION (ML) NASAL DAILY
Qty: 9.9 ML | Refills: 0 | Status: SHIPPED | OUTPATIENT
Start: 2025-01-31

## 2025-01-31 RX ORDER — METHYLPREDNISOLONE 4 MG/1
TABLET ORAL
Qty: 21 EACH | Refills: 0 | Status: SHIPPED | OUTPATIENT
Start: 2025-01-31

## 2025-01-31 RX ORDER — ALBUTEROL SULFATE 90 UG/1
2 INHALANT RESPIRATORY (INHALATION) EVERY 6 HOURS PRN
Qty: 6.7 G | Refills: 5 | Status: SHIPPED | OUTPATIENT
Start: 2025-01-31

## 2025-02-06 ENCOUNTER — RESULTS FOLLOW-UP (OUTPATIENT)
Dept: GASTROENTEROLOGY | Facility: AMBULARY SURGERY CENTER | Age: 55
End: 2025-02-06

## 2025-02-06 LAB
INFLIXIMAB AB SERPL-MCNC: 155 NG/ML
INFLIXIMAB SERPL-MCNC: 15 UG/ML

## 2025-02-10 ENCOUNTER — TELEPHONE (OUTPATIENT)
Age: 55
End: 2025-02-10

## 2025-02-10 NOTE — TELEPHONE ENCOUNTER
Jim from [facility] care calling in, pt refuses all premeds for his remicade infusion like methylprednisolone, benadryl and tylenol.   They will be taking this off pts med list unless any issues with that please call back at [phone]

## 2025-02-12 ENCOUNTER — OFFICE VISIT (OUTPATIENT)
Dept: FAMILY MEDICINE CLINIC | Facility: CLINIC | Age: 55
End: 2025-02-12
Payer: COMMERCIAL

## 2025-02-12 ENCOUNTER — APPOINTMENT (OUTPATIENT)
Dept: RADIOLOGY | Facility: CLINIC | Age: 55
End: 2025-02-12
Payer: COMMERCIAL

## 2025-02-12 VITALS
DIASTOLIC BLOOD PRESSURE: 68 MMHG | OXYGEN SATURATION: 99 % | TEMPERATURE: 98.2 F | HEIGHT: 69 IN | SYSTOLIC BLOOD PRESSURE: 120 MMHG | HEART RATE: 74 BPM | WEIGHT: 213.6 LBS | BODY MASS INDEX: 31.64 KG/M2

## 2025-02-12 DIAGNOSIS — R05.3 PERSISTENT COUGH: Primary | ICD-10-CM

## 2025-02-12 DIAGNOSIS — R05.3 PERSISTENT COUGH: ICD-10-CM

## 2025-02-12 PROCEDURE — 71046 X-RAY EXAM CHEST 2 VIEWS: CPT

## 2025-02-12 PROCEDURE — 99213 OFFICE O/P EST LOW 20 MIN: CPT | Performed by: FAMILY MEDICINE

## 2025-02-12 RX ORDER — BENZONATATE 200 MG/1
200 CAPSULE ORAL 3 TIMES DAILY PRN
Qty: 20 CAPSULE | Refills: 0 | Status: SHIPPED | OUTPATIENT
Start: 2025-02-12

## 2025-02-12 RX ORDER — BUDESONIDE AND FORMOTEROL FUMARATE DIHYDRATE 160; 4.5 UG/1; UG/1
2 AEROSOL RESPIRATORY (INHALATION) 2 TIMES DAILY
Qty: 10.2 G | Refills: 0 | Status: SHIPPED | OUTPATIENT
Start: 2025-02-12

## 2025-02-12 NOTE — PROGRESS NOTES
"Name: Naresh Mathew Jr.      : 1970      MRN: [id number]  Encounter Provider: Yvrose Ortiz DO  Encounter Date: 2025   Encounter department: [facility]  :  Assessment & Plan  Persistent cough  Patient finished Augmentin, Prednisone taper and albuterol - cough is unchanged.  Will check chest xray. Prescribed long acting bronchodilator and steroid inhaler and Benzonatate for cough. After he has the xray, will decide on further treatment.      Orders:  •  XR chest pa and lateral; Future  •  budesonide-formoterol (Symbicort) 160-4.5 mcg/act inhaler; Inhale 2 puffs 2 (two) times a day Rinse mouth after use.  •  benzonatate (TESSALON) 200 MG capsule; Take 1 capsule (200 mg total) by mouth 3 (three) times a day as needed for cough          Depression Screening and Follow-up Plan: Patient was screened for depression during today's encounter. They screened negative with a PHQ-2 score of 0.        History of Present Illness   Patient is here for follow up of cough - persists over the past three to four weeks. Was treated for a sinobroncial syndrome - finished antibiotic, steroid taper and albuterol. Has been taking Nyquil at night. White, foamy mucous. Felt tight on chest today.  Aggravated by going in and out of cold. Talking also aggravates it.  Is taking his meds for reflux regularly.      Review of Systems   Constitutional:  Negative for chills and fever.   HENT:  Negative for sore throat.         Ear pressure   Respiratory:  Positive for cough and chest tightness.        Objective   /68 (BP Location: Left arm, Patient Position: Sitting, Cuff Size: Large)   Pulse 74   Temp 98.2 °F (36.8 °C) (Temporal)   Ht 5' 9\" (1.753 m)   Wt 96.9 kg (213 lb 9.6 oz)   SpO2 99%   BMI 31.54 kg/m²      Physical Exam  Vitals and nursing note reviewed.   Constitutional:       General: He is not in acute distress.  HENT:      Head: Normocephalic.      Right Ear: Tympanic membrane normal.      Left " Ear: Tympanic membrane normal.      Nose: Congestion present.      Comments: Post nasal drainage     Mouth/Throat:      Pharynx: No posterior oropharyngeal erythema.   Neck:      Thyroid: No thyromegaly.   Cardiovascular:      Rate and Rhythm: Normal rate and regular rhythm.      Heart sounds: Normal heart sounds.   Pulmonary:      Effort: Pulmonary effort is normal.      Breath sounds: Normal breath sounds.   Lymphadenopathy:      Cervical: No cervical adenopathy.   Skin:     General: Skin is warm and dry.   Neurological:      Mental Status: He is alert and oriented to person, place, and time.   Psychiatric:         Mood and Affect: Mood normal.

## 2025-02-13 ENCOUNTER — TELEPHONE (OUTPATIENT)
Dept: GASTROENTEROLOGY | Facility: AMBULARY SURGERY CENTER | Age: 55
End: 2025-02-13

## 2025-02-13 DIAGNOSIS — R05.3 PERSISTENT COUGH: Primary | ICD-10-CM

## 2025-02-13 RX ORDER — PREDNISONE 10 MG/1
TABLET ORAL
Qty: 20 TABLET | Refills: 0 | Status: SHIPPED | OUTPATIENT
Start: 2025-02-13 | End: 2025-02-21

## 2025-02-13 NOTE — TELEPHONE ENCOUNTER
I called and spoke to patient after discussing with Dr. Price.  His recent lab work shows increased antibody level to over 150.  Dr. Price recommend switching medication at this time.  Recommend Skyrizi or Entyvio.  I called patient to discuss this.  He is very reluctant to switch medications as he currently is feeling well.  Discussed the reasoning for this in regards to his antibody levels being significantly elevated and his current Remicade will likely, inevitably stop working at some point if it is not already.  He would like me to send him further information on both of these medications which I will do and await for his decision.

## 2025-02-14 DIAGNOSIS — E78.1 HYPERTRIGLYCERIDEMIA: ICD-10-CM

## 2025-02-14 RX ORDER — FENOFIBRATE 54 MG/1
54 TABLET ORAL DAILY
Qty: 90 TABLET | Refills: 1 | Status: SHIPPED | OUTPATIENT
Start: 2025-02-14

## 2025-02-17 ENCOUNTER — TELEPHONE (OUTPATIENT)
Age: 55
End: 2025-02-17

## 2025-02-17 NOTE — TELEPHONE ENCOUNTER
Patients GI provider:  Dr. Price / [place]    Number to return call: [phone]    Reason for call: [facility] (Infusion services) calling rquesting confirmation of new infusion services being provided and an order to discharge / Explained a message would be sent    Scheduled procedure/appointment date if applicable:

## 2025-02-21 DIAGNOSIS — J01.90 ACUTE NON-RECURRENT SINUSITIS, UNSPECIFIED LOCATION: ICD-10-CM

## 2025-02-21 RX ORDER — FLUTICASONE PROPIONATE 50 MCG
SPRAY, SUSPENSION (ML) NASAL
Qty: 48 ML | Refills: 1 | Status: SHIPPED | OUTPATIENT
Start: 2025-02-21

## 2025-02-26 ENCOUNTER — TELEPHONE (OUTPATIENT)
Age: 55
End: 2025-02-26

## 2025-02-26 NOTE — TELEPHONE ENCOUNTER
"Recv'd in with  an order to \"discontinue remicade and to discontinue from service\".      Rosalee, are you anywhere that you can print/sign? Or we can print for you if this is to be stopped?  Thanks!  "

## 2025-02-27 NOTE — TELEPHONE ENCOUNTER
Yes ma'am.  It should be at the bottom of the encounter under a FORMS hyperlink, but if you can't see that, it's also under Media

## 2025-02-27 NOTE — TELEPHONE ENCOUNTER
That is how I read it. If he was receiving his infusions from [facility] and is no longer going to need their services, they want a sign off that it's no longer needed.

## 2025-03-04 ENCOUNTER — TELEPHONE (OUTPATIENT)
Age: 55
End: 2025-03-04

## 2025-03-04 NOTE — TELEPHONE ENCOUNTER
----- Message from Olu Price MD sent at 3/4/2025  1:33 PM EST -----  I could not find a therapy plan for the patient.  I think he gets this through an outside order.    I would like to increase his infliximab to 10 mg/kg every 6 weeks.    Please let me know how to put the order in.

## 2025-04-17 ENCOUNTER — TELEPHONE (OUTPATIENT)
Age: 55
End: 2025-04-17

## 2025-04-17 NOTE — TELEPHONE ENCOUNTER
[facility] calling in to let us know that the insurance only approved Remicade 5mg/kg.  The current order is for 10 mg/kg.   Please review and advise.

## 2025-04-17 NOTE — TELEPHONE ENCOUNTER
Update Infliximab Prescriber [place]fliximab 5 mg[place]kg [place] every 8 [place] [facility] [place]

## 2025-04-26 ENCOUNTER — OFFICE VISIT (OUTPATIENT)
Dept: URGENT CARE | Facility: CLINIC | Age: 55
End: 2025-04-26
Payer: COMMERCIAL

## 2025-04-26 VITALS
WEIGHT: 220 LBS | OXYGEN SATURATION: 96 % | DIASTOLIC BLOOD PRESSURE: 78 MMHG | TEMPERATURE: 97 F | HEART RATE: 63 BPM | HEIGHT: 69 IN | BODY MASS INDEX: 32.58 KG/M2 | RESPIRATION RATE: 18 BRPM | SYSTOLIC BLOOD PRESSURE: 104 MMHG

## 2025-04-26 DIAGNOSIS — H00.011 HORDEOLUM EXTERNUM OF RIGHT UPPER EYELID: Primary | ICD-10-CM

## 2025-04-26 PROCEDURE — S9083 URGENT CARE CENTER GLOBAL: HCPCS | Performed by: NURSE PRACTITIONER

## 2025-04-26 PROCEDURE — G0381 LEV 2 HOSP TYPE B ED VISIT: HCPCS | Performed by: NURSE PRACTITIONER

## 2025-04-26 RX ORDER — TOBRAMYCIN 3 MG/ML
1 SOLUTION/ DROPS OPHTHALMIC
Qty: 1.8 ML | Refills: 0 | Status: SHIPPED | OUTPATIENT
Start: 2025-04-26 | End: 2025-05-03

## 2025-04-26 NOTE — PROGRESS NOTES
"NAME: Naresh Mathew Jr. is a 54 y.o. male  : 1970    MRN: [id number]    /78   Pulse 63   Temp (!) 97 °F (36.1 °C) (Tympanic)   Resp 18   Ht 5' 9\" (1.753 m)   Wt 99.8 kg (220 lb)   SpO2 96%   BMI 32.49 kg/m²     3:01 PM    Assessment and Plan   Hordeolum externum of right upper eyelid [H00.011]  1. Hordeolum externum of right upper eyelid  tobramycin (TOBREX) 0.3 % SOLN          Jack was seen today for eye pain.    Diagnoses and all orders for this visit:    Hordeolum externum of right upper eyelid  -     tobramycin (TOBREX) 0.3 % SOLN; Administer 1 drop to the right eye every 4 (four) hours while awake for 7 days        Patient Instructions     Patient Instructions   Use eye drops as directed  Take antihistamine as directed     Proceed to the nearest ER if symptoms worsen, Follow up with your PCP  Continue to social distance, wash your hands, and wear your masks. Please continue to follow the CDC.gov guidelines daily for they are subject to change on COVID-19    Chief Complaint     Chief Complaint   Patient presents with    Eye Pain     Last night noticed soreness of his R eye. This morning woke up with increased pain, redness and swelling of the R eyelid. Itchy, painful to touch.         History of Present Illness     54 yr old male here today with right eye pain, itchiness and redness, area is painful to touch, denies trauma to the right eye. Pt has no visual disturbances.               Review of Systems   Review of Systems   Eyes:  Positive for pain (upper right eyelid). Negative for photophobia, discharge, redness, itching and visual disturbance.         Current Medications       Current Outpatient Medications:     tobramycin (TOBREX) 0.3 % SOLN, Administer 1 drop to the right eye every 4 (four) hours while awake for 7 days, Disp: 1.8 mL, Rfl: 0    albuterol (Proventil HFA) 90 mcg/act inhaler, Inhale 2 puffs every 6 (six) hours as needed for wheezing, Disp: 6.7 g, Rfl: 5    benzonatate " (TESSALON) 200 MG capsule, Take 1 capsule (200 mg total) by mouth 3 (three) times a day as needed for cough, Disp: 20 capsule, Rfl: 0    budesonide-formoterol (Symbicort) 160-4.5 mcg/act inhaler, Inhale 2 puffs 2 (two) times a day Rinse mouth after use., Disp: 10.2 g, Rfl: 0    esomeprazole (NexIUM) 20 mg capsule, Take 1 capsule (20 mg total) by mouth daily in the early morning, Disp: 90 capsule, Rfl: 2    famotidine (PEPCID) 20 mg tablet, TAKE 1 TABLET (20 MG TOTAL) BY MOUTH DAILY AT BEDTIME AS NEEDED FOR HEARTBURN, Disp: 90 tablet, Rfl: 1    fenofibrate (TRICOR) 54 MG tablet, TAKE 1 TABLET BY MOUTH EVERY DAY, Disp: 90 tablet, Rfl: 1    fluticasone (FLONASE) 50 mcg/act nasal spray, SPRAY 1 SPRAY INTO EACH NOSTRIL EVERY DAY, Disp: 48 mL, Rfl: 1    inFLIXimab (REMICADE) 100 mg, 500 mg IV infusion every 8 weeks, Disp: 5 each, Rfl: 3    losartan-hydrochlorothiazide (HYZAAR) 100-12.5 MG per tablet, Take 1 tablet by mouth daily, Disp: 90 tablet, Rfl: 1    metoprolol succinate (TOPROL-XL) 50 mg 24 hr tablet, Take 1 tablet (50 mg total) by mouth daily, Disp: 90 tablet, Rfl: 1    polyethylene glycol (GOLYTELY) 4000 mL solution, Take 4,000 mL by mouth once for 1 dose (Patient not taking: Reported on 2/12/2025), Disp: 4000 mL, Rfl: 0    Current Allergies     Allergies as of 04/26/2025    (No Known Allergies)              Past Medical History:   Diagnosis Date    Allergic     Anemia     Colon polyp     Crohn's disease (HCC)     GERD (gastroesophageal reflux disease)     Hypertension     Inflammatory bowel disease     Melanoma (HCC)        Past Surgical History:   Procedure Laterality Date    APPENDECTOMY      COLON SURGERY      x 2 bowel resections    COLONOSCOPY  04/23/2021    HAND SURGERY      SD COLONOSCOPY FLX DX W/COLLJ SPEC WHEN PFRMD N/A 02/12/2018    Procedure: EGD AND COLONOSCOPY;  Surgeon: Olu Price MD;  Location: [facility];  Service: Gastroenterology       Family History   Problem Relation Age of Onset     "Diabetes Mother     Cancer Father         colon    Hypertension Father     Crohn's disease Paternal Uncle          Medications have been verified.    The following portions of the patient's history were reviewed and updated as appropriate: allergies, current medications, past family history, past medical history, past social history, past surgical history and problem list.    Objective   /78   Pulse 63   Temp (!) 97 °F (36.1 °C) (Tympanic)   Resp 18   Ht 5' 9\" (1.753 m)   Wt 99.8 kg (220 lb)   SpO2 96%   BMI 32.49 kg/m²      Physical Exam     Physical Exam  Constitutional:       Appearance: Normal appearance.   Eyes:      General: Lids are everted, no foreign bodies appreciated. No allergic shiner or visual field deficit.        Right eye: Hordeolum present. No foreign body or discharge.         Left eye: No foreign body, discharge or hordeolum.      Extraocular Movements:      Right eye: Normal extraocular motion and no nystagmus.      Left eye: Normal extraocular motion and no nystagmus.      Conjunctiva/sclera: Conjunctivae normal.      Pupils: Pupils are equal, round, and reactive to light.     Neurological:      Mental Status: He is alert.               Note: Portions of this record may have been created with voice recognition software. Occasional wrong word or \"sound a like\" substitutions may have occurred due to the inherent limitations of voice recognition software. Please read the chart carefully and recognize, using context, where substitutions have occurred.*    OPAL Sommer  "

## 2025-05-09 DIAGNOSIS — I10 ESSENTIAL HYPERTENSION: ICD-10-CM

## 2025-05-09 RX ORDER — METOPROLOL SUCCINATE 50 MG/1
50 TABLET, EXTENDED RELEASE ORAL DAILY
Qty: 90 TABLET | Refills: 1 | Status: SHIPPED | OUTPATIENT
Start: 2025-05-09

## 2025-06-29 DIAGNOSIS — J01.90 ACUTE NON-RECURRENT SINUSITIS, UNSPECIFIED LOCATION: ICD-10-CM

## 2025-07-01 RX ORDER — FLUTICASONE PROPIONATE 50 MCG
SPRAY, SUSPENSION (ML) NASAL
Qty: 48 ML | Refills: 1 | Status: SHIPPED | OUTPATIENT
Start: 2025-07-01

## 2025-07-02 ENCOUNTER — OFFICE VISIT (OUTPATIENT)
Dept: GASTROENTEROLOGY | Facility: AMBULARY SURGERY CENTER | Age: 55
End: 2025-07-02
Payer: COMMERCIAL

## 2025-07-02 VITALS
HEIGHT: 69 IN | OXYGEN SATURATION: 95 % | SYSTOLIC BLOOD PRESSURE: 104 MMHG | WEIGHT: 222.6 LBS | DIASTOLIC BLOOD PRESSURE: 68 MMHG | HEART RATE: 73 BPM | BODY MASS INDEX: 32.97 KG/M2

## 2025-07-02 DIAGNOSIS — K21.9 GASTROESOPHAGEAL REFLUX DISEASE WITHOUT ESOPHAGITIS: ICD-10-CM

## 2025-07-02 DIAGNOSIS — K50.819 CROHN'S DISEASE OF SMALL AND LARGE INTESTINES WITH COMPLICATION (HCC): Primary | ICD-10-CM

## 2025-07-02 DIAGNOSIS — K21.9 GASTROESOPHAGEAL REFLUX DISEASE, UNSPECIFIED WHETHER ESOPHAGITIS PRESENT: ICD-10-CM

## 2025-07-02 DIAGNOSIS — E55.9 VITAMIN D DEFICIENCY: ICD-10-CM

## 2025-07-02 PROCEDURE — 99214 OFFICE O/P EST MOD 30 MIN: CPT | Performed by: PHYSICIAN ASSISTANT

## 2025-07-02 RX ORDER — FAMOTIDINE 20 MG/1
20 TABLET, FILM COATED ORAL
Qty: 90 TABLET | Refills: 1 | Status: SHIPPED | OUTPATIENT
Start: 2025-07-02

## 2025-07-02 NOTE — ASSESSMENT & PLAN NOTE
-currently on infliximab 10 mg/kg every 8 weeks. Insurance denied 10 mg/kg every 6 weeks, recent lab testing showing an increase in antibodies to 155 (previously were much less) but drug level was actually higher at 15. Overall patient is doing very well, is asymptomatic, had only mild active ileitis at anastomosis on colonoscopy earlier this year. Fecal calprotectin in Dec 2024 was also normal. He is having regular stools and no abdominal pain. He does have some joint pains recently but also believes he may have been exposed to Lyme disease.   -recommend continuing 10 mg/kg every 8 weeks, last infusion was 6/20.   -call with any concerning symptoms such as abdominal pain, diarrhea, bleeding, worsening joint pains  -will check CBC, CMP, infliximab levels/antibodies and vit D prior to next appt  -follows with dermatology regularly at [facility] in [place]  -follow up in 6 months.   Orders:    CBC and Platelet; Future    Comprehensive metabolic panel; Future    Infliximab Concentration and Anti-Infliximab Antibody; Future    Vitamin D 25 hydroxy; Future

## 2025-07-02 NOTE — ASSESSMENT & PLAN NOTE
-continue nexium 20 mg daily  and pepcid as needed, has occasional breakthrough symptoms with certain foods  -anti-reflux diet and measures     Orders:    esomeprazole (NexIUM) 20 mg capsule; Take 1 capsule (20 mg total) by mouth daily in the early morning    famotidine (PEPCID) 20 mg tablet; Take 1 tablet (20 mg total) by mouth daily at bedtime as needed for heartburn

## 2025-07-02 NOTE — ASSESSMENT & PLAN NOTE
-advised to take 1000 units daily, was not taking this prior to appt.  -will recheck vit D with labs in 6 months

## 2025-07-02 NOTE — PROGRESS NOTES
Name: Naresh Mathew Jr.      : 1970      MRN: [id number]  Encounter Provider: Malu Raymundo PA-C  Encounter Date: 2025   Encounter department: [facility]  :  Assessment & Plan  Crohn's disease of small and large intestines with complication (HCC)  -currently on infliximab 10 mg/kg every 8 weeks. Insurance denied 10 mg/kg every 6 weeks, recent lab testing showing an increase in antibodies to 155 (previously were much less) but drug level was actually higher at 15. Overall patient is doing very well, is asymptomatic, had only mild active ileitis at anastomosis on colonoscopy earlier this year. Fecal calprotectin in Dec 2024 was also normal. He is having regular stools and no abdominal pain. He does have some joint pains recently but also believes he may have been exposed to Lyme disease.   -recommend continuing 10 mg/kg every 8 weeks, last infusion was .   -call with any concerning symptoms such as abdominal pain, diarrhea, bleeding, worsening joint pains  -will check CBC, CMP, infliximab levels/antibodies and vit D prior to next appt  -follows with dermatology regularly at [facility] in [place]  -follow up in 6 months.   Orders:    CBC and Platelet; Future    Comprehensive metabolic panel; Future    Infliximab Concentration and Anti-Infliximab Antibody; Future    Vitamin D 25 hydroxy; Future    Gastroesophageal reflux disease without esophagitis  Gastroesophageal reflux disease, unspecified whether esophagitis present  -continue nexium 20 mg daily  and pepcid as needed, has occasional breakthrough symptoms with certain foods  -anti-reflux diet and measures     Orders:    esomeprazole (NexIUM) 20 mg capsule; Take 1 capsule (20 mg total) by mouth daily in the early morning    famotidine (PEPCID) 20 mg tablet; Take 1 tablet (20 mg total) by mouth daily at bedtime as needed for heartburn    Vitamin D deficiency  -advised to take 1000 units daily, was not taking this prior to  appt.  -will recheck vit D with labs in 6 months            History of Present Illness   HPI  Naresh Mathew Jr. is a 54 y.o. male with a history of hypertension, GERD, Crohn's disease of the small large intestine status post surgical resection many years ago currently maintained on infliximab 10 mg/kg every 8 weeks, vitamin D deficiency, elevated cholesterol who presents for a follow-up.  Patient's recent laboratory testing in January showed that he had an elevation in his antibodies but his drug level was actually improved at 15.  Fortunately patient is asymptomatic at this time and denies any diarrhea, constipation, abdominal pain, rectal bleeding.  His weight has been stable and appetite has been good.  He does admit to recently having some more issues with joint pains however he also believes he may have been exposed to Lyme disease.  He is going to work on getting a workup for this.  He denies any abnormal rashes.  He is tolerating the higher dose of 10 mg/kg of Remicade well and had his first dose of this on 6/20/2025.  His insurance refused to do 10 mg/kg every 6 weeks.  He had a colonoscopy earlier this year with some mild active ileitis at the anastomosis but otherwise biopsies were negative.  Prior to this he was on a Silvadene.  He has never been on any other Biologics aside from Remicade.  He follows with dermatology regularly and sees them every few months.  He is not currently taking a vitamin D supplement.      Review of Systems   Constitutional:  Negative for activity change, appetite change, fever and unexpected weight change.   HENT:  Negative for drooling, mouth sores, sore throat, trouble swallowing and voice change.    Eyes:  Negative for pain, discharge and visual disturbance.   Respiratory:  Negative for cough, choking, chest tightness and shortness of breath.    Cardiovascular:  Negative for chest pain, palpitations and leg swelling.   Gastrointestinal:  Negative for abdominal distention,  "abdominal pain, anal bleeding, blood in stool, constipation, diarrhea, nausea, rectal pain and vomiting.   Genitourinary:  Negative for decreased urine volume, difficulty urinating, dysuria, flank pain and urgency.   Musculoskeletal:  Positive for arthralgias. Negative for back pain, gait problem, myalgias and neck stiffness.   Skin:  Negative for color change, pallor and rash.   Neurological:  Negative for dizziness, syncope and light-headedness.   Hematological:  Negative for adenopathy.   Psychiatric/Behavioral:  Negative for confusion. The patient is not nervous/anxious.           Objective   /68 (BP Location: Left arm, Patient Position: Sitting, Cuff Size: Standard)   Pulse 73   Ht 5' 9\" (1.753 m)   Wt 101 kg (222 lb 9.6 oz)   SpO2 95%   BMI 32.87 kg/m²      Physical Exam  Constitutional:       General: He is not in acute distress.     Appearance: Normal appearance. He is well-developed.   HENT:      Head: Normocephalic and atraumatic.      Mouth/Throat:      Mouth: Mucous membranes are moist.     Eyes:      General: No scleral icterus.    Neck:      Trachea: No tracheal deviation.     Cardiovascular:      Rate and Rhythm: Normal rate and regular rhythm.      Heart sounds: Normal heart sounds. No murmur heard.  Pulmonary:      Effort: Pulmonary effort is normal. No respiratory distress.      Breath sounds: Normal breath sounds. No wheezing or rales.   Chest:      Chest wall: No tenderness.   Abdominal:      General: Bowel sounds are normal. There is no distension.      Palpations: Abdomen is soft. There is no mass.      Tenderness: There is no abdominal tenderness. There is no guarding or rebound.     Musculoskeletal:         General: Normal range of motion.      Cervical back: Normal range of motion and neck supple.     Skin:     General: Skin is warm and dry.      Coloration: Skin is not pale.      Findings: No rash.     Neurological:      Mental Status: He is alert and oriented to person, place, " and time. Mental status is at baseline.     Psychiatric:         Mood and Affect: Mood normal.         Behavior: Behavior normal.

## 2025-07-03 DIAGNOSIS — K21.9 GASTROESOPHAGEAL REFLUX DISEASE WITHOUT ESOPHAGITIS: ICD-10-CM

## 2025-07-03 RX ORDER — RABEPRAZOLE SODIUM 20 MG/1
20 TABLET, DELAYED RELEASE ORAL DAILY
Qty: 90 TABLET | Refills: 1 | Status: SHIPPED | OUTPATIENT
Start: 2025-07-03

## 2025-07-06 ENCOUNTER — TELEPHONE (OUTPATIENT)
Age: 55
End: 2025-07-06

## 2025-07-06 RX ORDER — PANTOPRAZOLE SODIUM 40 MG/1
TABLET, DELAYED RELEASE ORAL
Refills: 0 | OUTPATIENT
Start: 2025-07-06

## 2025-07-07 NOTE — TELEPHONE ENCOUNTER
PA for rabeprazole 20 mg    SUBMITTED to [facility]      [x]PA sent as URGENT    All office notes, labs and other pertaining documents and studies sent. Clinical questions answered. Awaiting determination from insurance company.     Turnaround time for your insurance to make a decision on your Prior Authorization can take 7-21 business days.

## 2025-07-07 NOTE — TELEPHONE ENCOUNTER
PA for rabeprazole APPROVED     Date(s) approved 7/7/2025    Case #[id number]     Patient advised by          []MyChart Message  [x]Phone call   []LMOM  []L/M to call office as no active Communication consent on file  []Unable to leave detailed message as VM not approved on Communication consent       Pharmacy advised by    [x]Fax  []Phone call  []Secure Chat       Approval letter scanned into Media Yes

## 2025-07-18 ENCOUNTER — TELEPHONE (OUTPATIENT)
Age: 55
End: 2025-07-18

## 2025-07-18 NOTE — TELEPHONE ENCOUNTER
Patient got another stye in his left eye.No appts available soon but he is asking for a script for this.

## (undated) DEVICE — SINGLE-USE BIOPSY FORCEPS: Brand: RADIAL JAW 4